# Patient Record
Sex: MALE | Race: ASIAN | NOT HISPANIC OR LATINO | URBAN - METROPOLITAN AREA
[De-identification: names, ages, dates, MRNs, and addresses within clinical notes are randomized per-mention and may not be internally consistent; named-entity substitution may affect disease eponyms.]

---

## 2024-05-25 ENCOUNTER — INPATIENT (INPATIENT)
Facility: HOSPITAL | Age: 49
LOS: 2 days | Discharge: HOME | DRG: 283 | End: 2024-05-28
Attending: SURGERY | Admitting: STUDENT IN AN ORGANIZED HEALTH CARE EDUCATION/TRAINING PROGRAM
Payer: MEDICAID

## 2024-05-25 VITALS
OXYGEN SATURATION: 100 % | SYSTOLIC BLOOD PRESSURE: 162 MMHG | WEIGHT: 177.91 LBS | DIASTOLIC BLOOD PRESSURE: 100 MMHG | TEMPERATURE: 98 F | RESPIRATION RATE: 18 BRPM | HEART RATE: 79 BPM

## 2024-05-25 DIAGNOSIS — R17 UNSPECIFIED JAUNDICE: ICD-10-CM

## 2024-05-25 LAB
ALBUMIN SERPL ELPH-MCNC: 4 G/DL — SIGNIFICANT CHANGE UP (ref 3.5–5.2)
ALBUMIN SERPL ELPH-MCNC: 4.4 G/DL — SIGNIFICANT CHANGE UP (ref 3.5–5.2)
ALP SERPL-CCNC: 270 U/L — HIGH (ref 30–115)
ALP SERPL-CCNC: 318 U/L — HIGH (ref 30–115)
ALT FLD-CCNC: 220 U/L — HIGH (ref 0–41)
ALT FLD-CCNC: 236 U/L — HIGH (ref 0–41)
ANION GAP SERPL CALC-SCNC: 12 MMOL/L — SIGNIFICANT CHANGE UP (ref 7–14)
ANION GAP SERPL CALC-SCNC: 13 MMOL/L — SIGNIFICANT CHANGE UP (ref 7–14)
APTT BLD: 34.5 SEC — SIGNIFICANT CHANGE UP (ref 27–39.2)
AST SERPL-CCNC: 106 U/L — HIGH (ref 0–41)
AST SERPL-CCNC: 96 U/L — HIGH (ref 0–41)
BASOPHILS # BLD AUTO: 0.06 K/UL — SIGNIFICANT CHANGE UP (ref 0–0.2)
BASOPHILS # BLD AUTO: 0.07 K/UL — SIGNIFICANT CHANGE UP (ref 0–0.2)
BASOPHILS NFR BLD AUTO: 0.9 % — SIGNIFICANT CHANGE UP (ref 0–1)
BASOPHILS NFR BLD AUTO: 0.9 % — SIGNIFICANT CHANGE UP (ref 0–1)
BILIRUB DIRECT SERPL-MCNC: 16 MG/DL — HIGH (ref 0–0.3)
BILIRUB DIRECT SERPL-MCNC: >17.2 MG/DL — HIGH (ref 0–0.3)
BILIRUB INDIRECT FLD-MCNC: 3.8 MG/DL — HIGH (ref 0.2–1.2)
BILIRUB INDIRECT FLD-MCNC: <4.9 MG/DL — HIGH (ref 0.2–1.2)
BILIRUB SERPL-MCNC: 19.8 MG/DL — CRITICAL HIGH (ref 0.2–1.2)
BILIRUB SERPL-MCNC: 22.1 MG/DL — CRITICAL HIGH (ref 0.2–1.2)
BUN SERPL-MCNC: 6 MG/DL — LOW (ref 10–20)
BUN SERPL-MCNC: 7 MG/DL — LOW (ref 10–20)
CALCIUM SERPL-MCNC: 10.1 MG/DL — SIGNIFICANT CHANGE UP (ref 8.4–10.4)
CALCIUM SERPL-MCNC: 9.5 MG/DL — SIGNIFICANT CHANGE UP (ref 8.4–10.5)
CHLORIDE SERPL-SCNC: 100 MMOL/L — SIGNIFICANT CHANGE UP (ref 98–110)
CHLORIDE SERPL-SCNC: 97 MMOL/L — LOW (ref 98–110)
CO2 SERPL-SCNC: 24 MMOL/L — SIGNIFICANT CHANGE UP (ref 17–32)
CO2 SERPL-SCNC: 27 MMOL/L — SIGNIFICANT CHANGE UP (ref 17–32)
CREAT SERPL-MCNC: 0.7 MG/DL — SIGNIFICANT CHANGE UP (ref 0.7–1.5)
CREAT SERPL-MCNC: 0.9 MG/DL — SIGNIFICANT CHANGE UP (ref 0.7–1.5)
EGFR: 103 ML/MIN/1.73M2 — SIGNIFICANT CHANGE UP
EGFR: 112 ML/MIN/1.73M2 — SIGNIFICANT CHANGE UP
EOSINOPHIL # BLD AUTO: 0.25 K/UL — SIGNIFICANT CHANGE UP (ref 0–0.7)
EOSINOPHIL # BLD AUTO: 0.35 K/UL — SIGNIFICANT CHANGE UP (ref 0–0.7)
EOSINOPHIL NFR BLD AUTO: 3.8 % — SIGNIFICANT CHANGE UP (ref 0–8)
EOSINOPHIL NFR BLD AUTO: 4.5 % — SIGNIFICANT CHANGE UP (ref 0–8)
GLUCOSE SERPL-MCNC: 151 MG/DL — HIGH (ref 70–99)
GLUCOSE SERPL-MCNC: 196 MG/DL — HIGH (ref 70–99)
HCT VFR BLD CALC: 39.5 % — LOW (ref 42–52)
HCT VFR BLD CALC: 43.1 % — SIGNIFICANT CHANGE UP (ref 42–52)
HGB BLD-MCNC: 13.9 G/DL — LOW (ref 14–18)
HGB BLD-MCNC: 14.7 G/DL — SIGNIFICANT CHANGE UP (ref 14–18)
IMM GRANULOCYTES NFR BLD AUTO: 0.5 % — HIGH (ref 0.1–0.3)
IMM GRANULOCYTES NFR BLD AUTO: 0.8 % — HIGH (ref 0.1–0.3)
INR BLD: 1.07 RATIO — SIGNIFICANT CHANGE UP (ref 0.65–1.3)
LDH SERPL L TO P-CCNC: 179 U/L — SIGNIFICANT CHANGE UP (ref 50–242)
LIDOCAIN IGE QN: 28 U/L — SIGNIFICANT CHANGE UP (ref 7–60)
LYMPHOCYTES # BLD AUTO: 0.96 K/UL — LOW (ref 1.2–3.4)
LYMPHOCYTES # BLD AUTO: 1.69 K/UL — SIGNIFICANT CHANGE UP (ref 1.2–3.4)
LYMPHOCYTES # BLD AUTO: 14.5 % — LOW (ref 20.5–51.1)
LYMPHOCYTES # BLD AUTO: 21.6 % — SIGNIFICANT CHANGE UP (ref 20.5–51.1)
MAGNESIUM SERPL-MCNC: 2.4 MG/DL — SIGNIFICANT CHANGE UP (ref 1.8–2.4)
MCHC RBC-ENTMCNC: 29.3 PG — SIGNIFICANT CHANGE UP (ref 27–31)
MCHC RBC-ENTMCNC: 29.4 PG — SIGNIFICANT CHANGE UP (ref 27–31)
MCHC RBC-ENTMCNC: 34.1 G/DL — SIGNIFICANT CHANGE UP (ref 32–37)
MCHC RBC-ENTMCNC: 35.2 G/DL — SIGNIFICANT CHANGE UP (ref 32–37)
MCV RBC AUTO: 83.5 FL — SIGNIFICANT CHANGE UP (ref 80–94)
MCV RBC AUTO: 85.9 FL — SIGNIFICANT CHANGE UP (ref 80–94)
MONOCYTES # BLD AUTO: 0.69 K/UL — HIGH (ref 0.1–0.6)
MONOCYTES # BLD AUTO: 0.82 K/UL — HIGH (ref 0.1–0.6)
MONOCYTES NFR BLD AUTO: 10.5 % — HIGH (ref 1.7–9.3)
MONOCYTES NFR BLD AUTO: 10.5 % — HIGH (ref 1.7–9.3)
NEUTROPHILS # BLD AUTO: 4.59 K/UL — SIGNIFICANT CHANGE UP (ref 1.4–6.5)
NEUTROPHILS # BLD AUTO: 4.84 K/UL — SIGNIFICANT CHANGE UP (ref 1.4–6.5)
NEUTROPHILS NFR BLD AUTO: 62 % — SIGNIFICANT CHANGE UP (ref 42.2–75.2)
NEUTROPHILS NFR BLD AUTO: 69.5 % — SIGNIFICANT CHANGE UP (ref 42.2–75.2)
NRBC # BLD: 0 /100 WBCS — SIGNIFICANT CHANGE UP (ref 0–0)
NRBC # BLD: 0 /100 WBCS — SIGNIFICANT CHANGE UP (ref 0–0)
PHOSPHATE SERPL-MCNC: 3.6 MG/DL — SIGNIFICANT CHANGE UP (ref 2.1–4.9)
PLATELET # BLD AUTO: 155 K/UL — SIGNIFICANT CHANGE UP (ref 130–400)
PLATELET # BLD AUTO: 168 K/UL — SIGNIFICANT CHANGE UP (ref 130–400)
PMV BLD: 12.9 FL — HIGH (ref 7.4–10.4)
PMV BLD: 12.9 FL — HIGH (ref 7.4–10.4)
POTASSIUM SERPL-MCNC: 4.1 MMOL/L — SIGNIFICANT CHANGE UP (ref 3.5–5)
POTASSIUM SERPL-MCNC: 4.3 MMOL/L — SIGNIFICANT CHANGE UP (ref 3.5–5)
POTASSIUM SERPL-SCNC: 4.1 MMOL/L — SIGNIFICANT CHANGE UP (ref 3.5–5)
POTASSIUM SERPL-SCNC: 4.3 MMOL/L — SIGNIFICANT CHANGE UP (ref 3.5–5)
PROT SERPL-MCNC: 6.4 G/DL — SIGNIFICANT CHANGE UP (ref 6–8)
PROT SERPL-MCNC: 7.3 G/DL — SIGNIFICANT CHANGE UP (ref 6–8)
PROTHROM AB SERPL-ACNC: 12.2 SEC — SIGNIFICANT CHANGE UP (ref 9.95–12.87)
RBC # BLD: 4.73 M/UL — SIGNIFICANT CHANGE UP (ref 4.7–6.1)
RBC # BLD: 5.02 M/UL — SIGNIFICANT CHANGE UP (ref 4.7–6.1)
RBC # FLD: 18.7 % — HIGH (ref 11.5–14.5)
RBC # FLD: 19.1 % — HIGH (ref 11.5–14.5)
SODIUM SERPL-SCNC: 136 MMOL/L — SIGNIFICANT CHANGE UP (ref 135–146)
SODIUM SERPL-SCNC: 137 MMOL/L — SIGNIFICANT CHANGE UP (ref 135–146)
WBC # BLD: 6.6 K/UL — SIGNIFICANT CHANGE UP (ref 4.8–10.8)
WBC # BLD: 7.81 K/UL — SIGNIFICANT CHANGE UP (ref 4.8–10.8)
WBC # FLD AUTO: 6.6 K/UL — SIGNIFICANT CHANGE UP (ref 4.8–10.8)
WBC # FLD AUTO: 7.81 K/UL — SIGNIFICANT CHANGE UP (ref 4.8–10.8)

## 2024-05-25 PROCEDURE — 76705 ECHO EXAM OF ABDOMEN: CPT | Mod: 26

## 2024-05-25 PROCEDURE — C1889: CPT

## 2024-05-25 PROCEDURE — 93005 ELECTROCARDIOGRAM TRACING: CPT

## 2024-05-25 PROCEDURE — 74177 CT ABD & PELVIS W/CONTRAST: CPT | Mod: 26,MC

## 2024-05-25 PROCEDURE — C1769: CPT

## 2024-05-25 PROCEDURE — 74018 RADEX ABDOMEN 1 VIEW: CPT

## 2024-05-25 PROCEDURE — 93010 ELECTROCARDIOGRAM REPORT: CPT

## 2024-05-25 PROCEDURE — 86901 BLOOD TYPING SEROLOGIC RH(D): CPT

## 2024-05-25 PROCEDURE — 99285 EMERGENCY DEPT VISIT HI MDM: CPT

## 2024-05-25 PROCEDURE — 84100 ASSAY OF PHOSPHORUS: CPT

## 2024-05-25 PROCEDURE — 80048 BASIC METABOLIC PNL TOTAL CA: CPT

## 2024-05-25 PROCEDURE — 94010 BREATHING CAPACITY TEST: CPT

## 2024-05-25 PROCEDURE — 76705 ECHO EXAM OF ABDOMEN: CPT

## 2024-05-25 PROCEDURE — C9399: CPT

## 2024-05-25 PROCEDURE — 36415 COLL VENOUS BLD VENIPUNCTURE: CPT

## 2024-05-25 PROCEDURE — 86900 BLOOD TYPING SEROLOGIC ABO: CPT

## 2024-05-25 PROCEDURE — 83735 ASSAY OF MAGNESIUM: CPT

## 2024-05-25 PROCEDURE — 99253 IP/OBS CNSLTJ NEW/EST LOW 45: CPT

## 2024-05-25 PROCEDURE — 83036 HEMOGLOBIN GLYCOSYLATED A1C: CPT

## 2024-05-25 PROCEDURE — 82962 GLUCOSE BLOOD TEST: CPT

## 2024-05-25 PROCEDURE — 85025 COMPLETE CBC W/AUTO DIFF WBC: CPT

## 2024-05-25 PROCEDURE — 85610 PROTHROMBIN TIME: CPT

## 2024-05-25 PROCEDURE — 86850 RBC ANTIBODY SCREEN: CPT

## 2024-05-25 PROCEDURE — 85730 THROMBOPLASTIN TIME PARTIAL: CPT

## 2024-05-25 PROCEDURE — C2625: CPT

## 2024-05-25 PROCEDURE — 80076 HEPATIC FUNCTION PANEL: CPT

## 2024-05-25 RX ORDER — PIPERACILLIN AND TAZOBACTAM 4; .5 G/20ML; G/20ML
3.38 INJECTION, POWDER, LYOPHILIZED, FOR SOLUTION INTRAVENOUS EVERY 8 HOURS
Refills: 0 | Status: DISCONTINUED | OUTPATIENT
Start: 2024-05-26 | End: 2024-05-28

## 2024-05-25 RX ORDER — PIPERACILLIN AND TAZOBACTAM 4; .5 G/20ML; G/20ML
3.38 INJECTION, POWDER, LYOPHILIZED, FOR SOLUTION INTRAVENOUS ONCE
Refills: 0 | Status: COMPLETED | OUTPATIENT
Start: 2024-05-25 | End: 2024-05-25

## 2024-05-25 RX ORDER — PIPERACILLIN AND TAZOBACTAM 4; .5 G/20ML; G/20ML
3.38 INJECTION, POWDER, LYOPHILIZED, FOR SOLUTION INTRAVENOUS ONCE
Refills: 0 | Status: COMPLETED | OUTPATIENT
Start: 2024-05-26 | End: 2024-05-26

## 2024-05-25 RX ORDER — ENOXAPARIN SODIUM 100 MG/ML
40 INJECTION SUBCUTANEOUS EVERY 24 HOURS
Refills: 0 | Status: DISCONTINUED | OUTPATIENT
Start: 2024-05-25 | End: 2024-05-28

## 2024-05-25 RX ORDER — SODIUM CHLORIDE 9 MG/ML
1000 INJECTION, SOLUTION INTRAVENOUS
Refills: 0 | Status: DISCONTINUED | OUTPATIENT
Start: 2024-05-25 | End: 2024-05-26

## 2024-05-25 RX ORDER — METOPROLOL TARTRATE 50 MG
25 TABLET ORAL
Refills: 0 | Status: DISCONTINUED | OUTPATIENT
Start: 2024-05-25 | End: 2024-05-25

## 2024-05-25 RX ORDER — METOPROLOL TARTRATE 50 MG
25 TABLET ORAL EVERY 12 HOURS
Refills: 0 | Status: DISCONTINUED | OUTPATIENT
Start: 2024-05-25 | End: 2024-05-28

## 2024-05-25 RX ORDER — METOPROLOL TARTRATE 50 MG
1 TABLET ORAL
Refills: 0 | DISCHARGE

## 2024-05-25 RX ORDER — AMLODIPINE BESYLATE 2.5 MG/1
5 TABLET ORAL DAILY
Refills: 0 | Status: DISCONTINUED | OUTPATIENT
Start: 2024-05-25 | End: 2024-05-25

## 2024-05-25 RX ORDER — LISINOPRIL 2.5 MG/1
20 TABLET ORAL DAILY
Refills: 0 | Status: DISCONTINUED | OUTPATIENT
Start: 2024-05-25 | End: 2024-05-25

## 2024-05-25 RX ORDER — ACETAMINOPHEN 500 MG
650 TABLET ORAL EVERY 6 HOURS
Refills: 0 | Status: DISCONTINUED | OUTPATIENT
Start: 2024-05-25 | End: 2024-05-28

## 2024-05-25 RX ORDER — AMLODIPINE BESYLATE AND BENAZEPRIL HYDROCHLORIDE 10; 20 MG/1; MG/1
1 CAPSULE ORAL
Refills: 0 | DISCHARGE

## 2024-05-25 RX ADMIN — PIPERACILLIN AND TAZOBACTAM 25 GRAM(S): 4; .5 INJECTION, POWDER, LYOPHILIZED, FOR SOLUTION INTRAVENOUS at 19:46

## 2024-05-25 RX ADMIN — SODIUM CHLORIDE 140 MILLILITER(S): 9 INJECTION, SOLUTION INTRAVENOUS at 19:26

## 2024-05-25 RX ADMIN — PIPERACILLIN AND TAZOBACTAM 200 GRAM(S): 4; .5 INJECTION, POWDER, LYOPHILIZED, FOR SOLUTION INTRAVENOUS at 15:31

## 2024-05-25 RX ADMIN — Medication 25 MILLIGRAM(S): at 18:11

## 2024-05-25 NOTE — PATIENT PROFILE ADULT - FALL HARM RISK - HARM RISK INTERVENTIONS
Communicate Risk of Fall with Harm to all staff/Reinforce activity limits and safety measures with patient and family/Tailored Fall Risk Interventions/Use of alarms - bed, chair and/or voice tab/Visual Cue: Yellow wristband and red socks/Bed in lowest position, wheels locked, appropriate side rails in place/Call bell, personal items and telephone in reach/Instruct patient to call for assistance before getting out of bed or chair/Non-slip footwear when patient is out of bed/[place] to call system/Physically safe environment - no spills, clutter or unnecessary equipment/Purposeful Proactive Rounding/Room/bathroom lighting operational, light cord in reach

## 2024-05-25 NOTE — CONSULT NOTE ADULT - ATTENDING COMMENTS
This note reflects my care of this patient on 5/25/2024.    This is 49 year old male with PMH of HTN, cholecystitis (6 years ago), presents with jaundice. Patient reports that he noticed scleral icterus 3 weeks ago. For the past 3 weeks, patient has been experiencing dark urine and light, myesha colored stools. Patient has mild RUQ abdominal pain and nausea, not associated with food for the past 2 weeks. Patient denies fevers, chills, SOB, CP, vomiting.     PE:  AAO x4  Chest: clear.  CV : rrr  Abdomen: mildly tender to palpation in epigastrium.    ASSESSMENT:  48 y/o male with Choledocholithiasis.  Dilated CBD.  Elevated LFTs.  Obstructive jaundice.  At risk for hemodynamic instability.    PLAN:  - pain control as needed  - encourage incentive spirometer  - use O2 with NC as needed  - keep MAP>65; on ivf  - NPO  - GI eval for ERCP  - follow LFTs  - follow serum electrolytes and UOP  - ID - put on empiric Zosyn  - DVT prophylaxis  Admit to SDU.
Obstructive jaundice due to a detected CBD stone seen on imaging. RUQ tenderness on exam. Denies pain endorses nausea. No evidence of suppurative cholangitis (no leukocytosis and normal VS). Continue to monitor LFTs CBC and VS. May consider ERCP on emergent basis. NPo after midnight for now. Patient requesting DC to get ERCP on outpatient basis with Dr. Montejo. Will continue to follow for now.

## 2024-05-25 NOTE — CONSULT NOTE ADULT - ASSESSMENT
49 year old male with no known PMH presented to ER with worsening jaundice x3 weeks with myesha colored stools, dark urine. Patient reports that he has never seen a doctor for this in the past 3 weeks. He reports intermittent epigastric and RUQ discomfort/pain 1/10 . He is tolerating diet, but sometimes has nausea, no vomiting, fevers, chills. He also reports he had cholecystitis 6-7 years was recommended CCY but patient never followed up. Currently hemodynamically stable with cholestatic liver pattern. GI consulted for choledocholithiasis.     #Cholecholithiasis  #Cholestatic liver injury likely CBD stone related  - T darya 22.1         05-25-24 @ 08:30, Direct bili >17.2  - exam benign  - no concern for cholangitis  - WBC: 6k, afebrile  - hx of cholecystitis 6-7 years ago  - denies alcohol use, unintentional weight loss, active smoker  - lipase normal  - CTAP IC: moderate to severe intrahepatic biliary ductal   dilatation secondary to choledocholithiasis. More specifically, 1 cm   calcified gallstone is impacted within the CBD which measures up to 2 cm.   The CBD in the region of the pancreatic head is normal in caliber.   Cholelithiasis is noted as well.    RECS  - Admit  - Recommend RUQ US  - Trend LFTs, monitor for fevers// monitor CBC  - If any decompensation will plan for urgent ERCP or else will plan for EUS+ERCP on 5/28  - Recommend surgery evaluation  - Can be clears for now from GI standpoint  - will follow    Plan discussed with attending     49 year old male with no known PMH presented to ER with worsening jaundice x3 weeks with myesha colored stools, dark urine. Patient reports that he has never seen a doctor for this in the past 3 weeks. He reports intermittent epigastric and RUQ discomfort/pain 1/10 . He is tolerating diet, but sometimes has nausea, no vomiting, fevers, chills. He also reports he had cholecystitis 6-7 years was recommended CCY but patient never followed up. Currently hemodynamically stable with cholestatic liver pattern. GI consulted for choledocholithiasis.     #Cholecholithiasis  #Cholestatic liver injury likely CBD stone related  - T darya 22.1         05-25-24 @ 08:30, Direct bili >17.2  - exam benign  - no concern for cholangitis  - WBC: 6k, afebrile  - hx of cholecystitis 6-7 years ago  - denies alcohol use, unintentional weight loss, active smoker  - lipase normal  - CTAP IC: moderate to severe intrahepatic biliary ductal   dilatation secondary to choledocholithiasis. More specifically, 1 cm   calcified gallstone is impacted within the CBD which measures up to 2 cm.   The CBD in the region of the pancreatic head is normal in caliber.   Cholelithiasis is noted as well.    RECS  - Admit  - Recommend RUQ US  - Trend LFTs, monitor for fevers// monitor CBC  - Will plan for EUS/ERCP this admission or sooner if any signs of decmpensation  - Recommend surgery evaluation for CCY  - Can be clears for now from GI standpoint  - will follow    Plan discussed with attending

## 2024-05-25 NOTE — ED PROVIDER NOTE - CLINICAL SUMMARY MEDICAL DECISION MAKING FREE TEXT BOX
49-year-old male past medical history of diabetes hypertension noncompliant with medications presents with 2 to 3 weeks of intermittent right upper quadrant pain associated with dark urine yellowing of skin yellow eyes skin itching.  No abdominal pain at this time.  No nausea vomiting diarrhea.  Denies alcohol use or Tylenol use.  No chest pain shortness of breath.  No urinary symptoms.  No recent travel sick contacts.  No fever or chills.    On exam, AFVSS, Well appearing, No acute distress, NCAT, EOMI, PERRLA, MMM, Neck supple, LCTAB, RRR nl s1s2 No mrg, Abdomen Soft NTND, no CVA tenderness, AAOx3, No Focal Deficits, No LE edema or calf TTP, jaundice and scleral icterus noted    A/P; jaundice, intermittent right upper quadrant pain, found to have T. bili 22 and choledocholithiasis on imaging, GI and surgery consulted, admit for further workup and treatment    Labs were ordered and reviewed.  Imaging was ordered and reviewed by me.  Appropriate medications for patient's presenting complaints were ordered and effects were reassessed.  Patient's records (prior hospital, ED visit, and/or nursing home notes if available) were reviewed.  Additional history was obtained from EMS, family, and/or PCP (where available).  Escalation to admission/observation was considered.  Patient requires inpatient hospitalization - monitored setting.

## 2024-05-25 NOTE — H&P ADULT - ATTENDING COMMENTS
ACS Attending Note Attestation    Patient is examined and evaluated at the bedside with the residents/PAs. Treatment plan discussed with the team, nurses, and consulting physicians and consulting teams. Medications, radiological studies and all other relevant studies reviewed. I reviewed the resident/PA note and agreed with above assessment and plan with following additions and corrections.    MEKHI RANGEL 49 year old male with PMH of HTN, cholecystitis (6 years ago), presents with jaundice. Patient reports that he noticed scleral icterus 3 weeks ago. For the past 3 weeks, patient has been experiencing dark urine and light, myesha colored stools. Patient has mild RUQ abdominal pain and nausea, no associated with food for the past 2 weeks. Patient denies fevers, chills, SOB, CP, vomiting. Patient is noncompliant to his blood pressure medications, reports stopped taking his blood pressure medications 3 days ago due to self decision making. At bedside examination, patient has scleral icterus and jaundice throughout, abdomen soft, ND, NT.  Physical Exam:  General: NAD, AAOx3, calm and cooperative  HEENT: NCAT, PARISA, EOMI, scleral icterus  Cardiac: RRR  Respiratory: CTAB, normal respiratory effort  Abdomen: Soft, non-distended, non-tender, no rebound, no guarding  Skin: Warm/dry, normal color, + jaundice  Radiological studies reviewed by me with following noted:  Allergies    No Known Allergies    Intolerances      PAST MEDICAL & SURGICAL HISTORY:  HTN (hypertension)            Vital Signs Last 24 Hrs  T(C): 37.1 (25 May 2024 19:32), Max: 37.1 (25 May 2024 19:32)  T(F): 98.8 (25 May 2024 19:32), Max: 98.8 (25 May 2024 19:32)  HR: 69 (25 May 2024 19:32) (69 - 79)  BP: 163/78 (25 May 2024 19:32) (149/87 - 163/78)  BP(mean): --  RR: 26 (25 May 2024 19:32) (18 - 26)  SpO2: 100% (25 May 2024 19:32) (99% - 100%)    Parameters below as of 25 May 2024 19:32  Patient On (Oxygen Delivery Method): room air                            13.9   7.81  )-----------( 155      ( 25 May 2024 16:42 )             39.5     05-25    136  |  100  |  6<L>  ----------------------------<  151<H>  4.1   |  24  |  0.7    Ca    9.5      25 May 2024 16:42  Phos  3.6     05-25  Mg     2.4     05-25    TPro  6.4  /  Alb  4.0  /  TBili  19.8<HH>  /  DBili  16.0<H>  /  AST  96<H>  /  ALT  220<H>  /  AlkPhos  270<H>  05-25      Diagnosis: Choledocholithiasis , stone in CBD with obstruction , billary ducts dilatation , juandice       - Admit to surgery   - SICU consult   - Close hemodynamic monitoring   - NPO, IVF   - IV ABX  - Trend LFTs  - GI following for ERCP	  - supportive care  - GI/DVT prophylaxis  - pain management  - follow up consults  - repeat studies as needed    Anjum Bowens MD, FACS  Trauma/ACS/Surcical Critical care Attending ACS Attending Note Attestation    Patient is examined and evaluated at the bedside with the residents/PAs. Treatment plan discussed with the team, nurses, and consulting physicians and consulting teams. Medications, radiological studies and all other relevant studies reviewed. I reviewed the resident/PA note and agreed with above assessment and plan with following additions and corrections.    MEKHI RANGEL 49 year old male with PMH of HTN, cholecystitis (6 years ago), presents with jaundice. Patient reports that he noticed scleral icterus 3 weeks ago. For the past 3 weeks, patient has been experiencing dark urine and light, myesha colored stools. Patient has mild RUQ abdominal pain and nausea, no associated with food for the past 2 weeks. Patient denies fevers, chills, SOB, CP, vomiting. Patient is noncompliant to his blood pressure medications, reports stopped taking his blood pressure medications 3 days ago due to self decision making. At bedside examination, patient has scleral icterus and jaundice throughout, abdomen soft, ND, NT.  Physical Exam:  General: NAD, AAOx3, calm and cooperative  HEENT: NCAT, PARISA, EOMI, scleral icterus  Cardiac: RRR  Respiratory: CTAB, normal respiratory effort  Abdomen: Soft, non-distended, non-tender, no rebound, no guarding  Skin: Warm/dry, normal color, + jaundice  Radiological studies reviewed by me with following noted:  Allergies    No Known Allergies    Intolerances      PAST MEDICAL & SURGICAL HISTORY:  HTN (hypertension)            Vital Signs Last 24 Hrs  T(C): 37.1 (25 May 2024 19:32), Max: 37.1 (25 May 2024 19:32)  T(F): 98.8 (25 May 2024 19:32), Max: 98.8 (25 May 2024 19:32)  HR: 69 (25 May 2024 19:32) (69 - 79)  BP: 163/78 (25 May 2024 19:32) (149/87 - 163/78)  BP(mean): --  RR: 26 (25 May 2024 19:32) (18 - 26)  SpO2: 100% (25 May 2024 19:32) (99% - 100%)    Parameters below as of 25 May 2024 19:32  Patient On (Oxygen Delivery Method): room air                            13.9   7.81  )-----------( 155      ( 25 May 2024 16:42 )             39.5     05-25    136  |  100  |  6<L>  ----------------------------<  151<H>  4.1   |  24  |  0.7    Ca    9.5      25 May 2024 16:42  Phos  3.6     05-25  Mg     2.4     05-25    TPro  6.4  /  Alb  4.0  /  TBili  19.8<HH>  /  DBili  16.0<H>  /  AST  96<H>  /  ALT  220<H>  /  AlkPhos  270<H>  05-25      Diagnosis: Choledocholithiasis , stone in CBD with obstruction , billary ducts dilatation , juandice, Cholelithiasis.     - Admit to surgery   - SICU consult   - Close hemodynamic monitoring   - NPO, IVF   - IV ABX  - Trend LFTs  - GI following for ERCP	  - supportive care  - GI/DVT prophylaxis  - pain management  - follow up consults  - repeat studies as needed    Anjum Bowens MD, FACS  Trauma/ACS/Surcical Critical care Attending

## 2024-05-25 NOTE — CONSULT NOTE ADULT - ASSESSMENT
Assessment & Plan    49y Male  POD# s/p       NEURO:  #Acute pain    -APAP prn, Gabapentin 100mg q8    -if intubated Fentanyl 12.5 mcg q4 prn    -Inpatient Rx: acetaminophen     Tablet .. 650 milliGRAM(s) Oral every 6 hours      RESP:   #Oxygenation    -wean off NC to RA as tolerated  #Activity    -increase as tolerated    CARDS:   #  Imaging:   Labs:   Rx-metoprolol tartrate 25 every 12 hours      GI/NUTR:   #Diet, NPO    Diet, NPO:   Except Medications     Special Instructions for Nursing:  Except Medications [Active]        -aspiration precautions, HOB 30  #GI Prophylaxis    -not indicated  #Bowel regimen    -senna/miralax    -last bowel movement      /RENAL:   #urine output in crtically ill    Labs:   BUN/Cr- 7/0.9  -->  Electrolytes-(05-25 @ 08:30)Na 137 // K 4.3 // Mg -- // Phos --     #maintain euvolemia                     HEME/ONC:     Labs: Hb/Hct:  14.7/43.1  (05-25 @ 08:30)  -->                      Plts:  168  -->                 PTT/INR:  34.5/1.07  --->       Blood Consent-obtain if acute anemia, q6 CBC    ID:  #monitor for leukocytosis/fever  WBC- 6.60  --->>  Temp trend- 24hrs T(F): 98.5 (05-25 @ 06:49), Max: 98.5 (05-25 @ 06:49)  Current antibiotics-      ENDO:    -FSG q6 if NPO or Tube feeds    -Glucose goal 140-180    -if above 180 start ISS     HA1C     MSK:     Activity - Ambulate as Tolerated:     Time/Priority:  Routine (05-25-24 @ 13:08)      HOME Medications:      LINES/DRAINS:  PIV, Flores    ADVANCED DIRECTIVES:  Full Code    HCP/Emergency Contact-    INDICATION FOR SICU/SDU: Jaundice             DISPO:   Case discussed with attending   Assessment & Plan  49y Male s/p choledocholithiasis admitted for hepatic function and hemodynamic monitoring    NEURO:  #Acute pain    -APAP q6 ATC    RESP:   #Oxygenation    -tolerated RA    -encourage IS  #Activity    -ambulateas tolerated    CARDS:   #Hx of HTN    - continue metoprolol tartrate 25 every 12 hours (home dose metoprolol succinate 50 QD)    - holding home amlodipine-benazepril     GI/NUTR:   #Choledocholithiasis    LIVER FUNCTIONS - ( 25 May 2024 08:30 )  Alb: 4.4 g/dL / Pro: 7.3 g/dL / ALK PHOS: 318 U/L / ALT: 236 U/L / AST: 106 U/L / GGT: x           - monitor for signs of cholangitis, trend LFTS    - GI following    - pending ERCP  #Diet, NPO Except Medications    -aspiration precautions, HOB 30  #GI Prophylaxis    -not indicated  #Bowel regimen    -not indicated    -last bowel movement 5/24 prior to admission    /RENAL:   #urine output in critically ill    - voiding freely  #maintain euvolemia    - on LR @ 140cc/hr  #2.3cm left renal mass incidental finding on CT    - possible outpatient urology followup may be needed    Labs:   BUN/Cr- 7/0.9  -->  Electrolytes-(05-25 @ 08:30)Na 137 // K 4.3 // Mg -- // Phos --          HEME/ONC:   #DVT prophylaxis    -enoxaparin Injectable  , SCDs    Labs: Hb/Hct:  14.7/43.1  -->                      Plts:  168  -->                 PTT/INR:  34.5/1.07  --->       Home Rx:   T&S Expires:     ID:  #monitor for leukocytosis/fever  WBC- 6.60  --->>  Temp trend- 24hrs T(F): 98.5 (05-25 @ 06:49), Max: 98.5 (05-25 @ 06:49)  No Current antibiotics    ENDO:  #Hx of DM    -FSG q6 while NPO    -Glucose goal 140-180    -if above 180 start ISS     HA1C     MSK:     Activity - Ambulate as Tolerated:     Time/Priority:  Routine (05-25-24 @ 13:08)    LINES/DRAINS:  PIV,     ADVANCED DIRECTIVES:  Full Code    HCP/Emergency Contact-    INDICATION FOR SDU: hemodynamic monitoring in setting of choledocholithiasis            DISPO:   SDU, Case discussed with Dr. Zabala

## 2024-05-25 NOTE — ED PROVIDER NOTE - CARE PLAN
Principal Discharge DX:	Jaundice  Secondary Diagnosis:	Choledocholithiasis with obstruction  Secondary Diagnosis:	Transaminitis   1

## 2024-05-25 NOTE — ED PROVIDER NOTE - PROGRESS NOTE DETAILS
GI at bedside GI consulted for choledocholithiasis and biliary ductal dilatation Gi recs--clears for now, RUQ US and surgery consult.

## 2024-05-25 NOTE — H&P ADULT - NSVTERISKASSESS_GEN_ALL_CORE FT
Medical Assessment Completed on: 25-May-2024 12:28 Surgical Assessment Completed on: 25-May-2024 13:18

## 2024-05-25 NOTE — ED PROVIDER NOTE - ATTENDING APP SHARED VISIT CONTRIBUTION OF CARE
49-year-old male past medical history of diabetes hypertension noncompliant with medications presents with 2 to 3 weeks of intermittent right upper quadrant pain associated with dark urine yellowing of skin yellow eyes skin itching.  No abdominal pain at this time.  No nausea vomiting diarrhea.  Denies alcohol use or Tylenol use.  No chest pain shortness of breath.  No urinary symptoms.  No recent travel sick contacts.  No fever or chills.    On exam, AFVSS, Well appearing, No acute distress, NCAT, EOMI, PERRLA, MMM, Neck supple, LCTAB, RRR nl s1s2 No mrg, Abdomen Soft NTND, no CVA tenderness, AAOx3, No Focal Deficits, No LE edema or calf TTP, jaundice and scleral icterus noted    A/P; jaundice, intermittent right upper quadrant pain, found to have T. bili 22 and choledocholithiasis on imaging, GI and surgery consulted, admit for further workup and treatment

## 2024-05-25 NOTE — CONSULT NOTE ADULT - SUBJECTIVE AND OBJECTIVE BOX
Gastroenterology Consultation:    Patient is a 49y old  Male who presents with a chief complaint of       Admitted on: 05-25-24      HPI:  49 year old male with no known PMH presented to ER with worsening jaundice x3 weeks with myesha colored stools, dark urine. Patient reports that he has never seen a doctor for this in the past 3 weeks. He reports intermittent epigastric and RUQ discomfort/pain 1/10 . He is tolerating diet, but sometimes has nausea, no vomiting, fevers, chills. He also reports he had cholecystitis 6-7 years was recommended CCY but patient never followed up. Currently hemodynamically stable with cholestatic liver pattern. GI consulted for choledocholithiasis.       Prior EGD: never had any    Prior Colonoscopy: never had any      PAST MEDICAL & SURGICAL HISTORY:        FAMILY HISTORY:  NO GI related malignancies/disorders/IBD    Social History:  Tobacco: active smoker  Alcohol: denies  Drugs: denies    Home Medications:        MEDICATIONS  (STANDING):    MEDICATIONS  (PRN):      Allergies  No Known Allergies      Review of Systems:   Constitutional:  No Fever, No Chills  ENT/Mouth:  No Hearing Changes,  No Difficulty Swallowing  Eyes:  No Eye Pain, No Vision Changes  Cardiovascular:  No Chest Pain, No Palpitations  Respiratory:  No Cough, No Dyspnea  Gastrointestinal:  As described in HPI          Physical Examination:  T(C): 36.9 (05-25-24 @ 06:49), Max: 36.9 (05-25-24 @ 06:49)  HR: 79 (05-25-24 @ 06:49) (79 - 79)  BP: 162/100 (05-25-24 @ 06:49) (162/100 - 162/100)  RR: 18 (05-25-24 @ 06:49) (18 - 18)  SpO2: 100% (05-25-24 @ 06:49) (100% - 100%)    Weight (kg): 80.7 (05-25-24 @ 06:49)        GENERAL: AAOx3, no acute distress.  HEAD:  Atraumatic, Normocephalic  EYES: conjunctiva and sclera clear  CHEST/LUNG: Clear to auscultation bilaterally; No wheeze, rhonchi, or rales  HEART: Regular rate and rhythm; normal S1, S2, No murmurs.  ABDOMEN: Soft, nontender, nondistended; Bowel sounds present  SKIN: Intact, jaundice +        Data:                        14.7   6.60  )-----------( 168      ( 25 May 2024 08:30 )             43.1     Hgb Trend:  14.7  05-25-24 @ 08:30      05-25    137  |  97<L>  |  7<L>  ----------------------------<  196<H>  4.3   |  27  |  0.9    Ca    10.1      25 May 2024 08:30    TPro  7.3  /  Alb  4.4  /  TBili  22.1<HH>  /  DBili  >17.2<H>  /  AST  106<H>  /  ALT  236<H>  /  AlkPhos  318<H>  05-25    Liver panel trend:  TBili 22.1   /      /      /   AlkP 318   /   Tptn 7.3   /   Alb 4.4    /   DBili >17.2      05-25      PT/INR - ( 25 May 2024 08:30 )   PT: 12.20 sec;   INR: 1.07 ratio         PTT - ( 25 May 2024 08:30 )  PTT:34.5 sec        Radiology:  CT Abdomen and Pelvis w/ IV Cont:   ACC: [id number] EXAM:  CT ABDOMEN AND PELVIS IC   ORDERED BY: NATALIE HANDY     PROCEDURE DATE:  05/25/2024          INTERPRETATION:  Clinical History / Reason for exam: Jaundice    Technique:  Contiguous axial CT images of the abdomen and pelvis were   obtained following administration of intravenous contrast.  Oral contrast   was not administered.  Reformatted images in the coronal and sagittal   planes were acquired.    IV Contrast: Omnipaque 350  100 cc administered   0 cc discarded  Oral Contrast: NONE  Complications: None reported at time of study completion        Comparison CT: None    FINDINGS:    LOWER CHEST: Lateral groundglass opacities likely secondary to   atelectatic changes as well as small airway disease.    HEPATOBILIARY: There is moderate to severe intrahepatic biliary ductal   dilatation secondary to choledocholithiasis. More specifically, 1 cm   calcified gallstone is impacted within the CBD which measures up to 2 cm.   The CBD in the region of the pancreatic head is normal in caliber.   Cholelithiasis is noted as well.    SPLEEN: Unremarkable.    PANCREAS: Unremarkable.    ADRENAL GLANDS: Nonspecific thickening of the left adrenal. Unremarkable   right adrenal.    KIDNEYS: Symmetric pattern of renal enhancement. Noevidence of   hydronephrosis or hydroureter. Above fluid density 2.3 cm lesion is   arising from the left upper renal pole.    ABDOMINOPELVIC NODES: No enlarged abdominal or pelvic lymph nodes.    PELVIC ORGANS: Unremarkable.    PERITONEUM/MESENTERY/BOWEL: No bowel obstruction, ascites or free   intraperitoneal air. The appendix is normal in caliber.    BONES/SOFT TISSUES: There are degenerative changes of the spine and both   hips.    VASCULAR:  The aorta is normal in caliber. There are mild atherosclerotic   calcifications.      IMPRESSION:      Choledocholithiasis with upstream intrahepatic biliary ductal dilatation   as above.    Cholelithiasis.    --- End of Report ---            BRANDY GERARDO MD; Attending Radiologist  This document has been electronically signed. May 25 2024  8:57AM (05-25-24 @ 08:42)

## 2024-05-25 NOTE — CHART NOTE - NSCHARTNOTEFT_GEN_A_CORE
I was initially admitting the patient however the Patient will be now be admitted to surgery.  Upon Chart reviewing the patient that patient appeared to have a 2.3 cm  Left renal mass.   Please refer to Outpatient urology for assessment of this mass. \  Thank you

## 2024-05-25 NOTE — ED PROVIDER NOTE - OBJECTIVE STATEMENT
48yo male with PMHx DM non-compliant with metformin x 2 yrs and HTN, also intermittently compliant with anti-hypertensive meds, presents c/o jaundice for the past 2-3 days and dark colored urine x 2-3 weeks. He reports some nausea. He denies fever, chills, vomiting, diarrhea. Denies any specific aggravating or relieving factors. Pt reports was told years prior that he had gallstones and was a surgical candidate but once pain dissipated he did not follow up for surgery.

## 2024-05-25 NOTE — CONSULT NOTE ADULT - SUBJECTIVE AND OBJECTIVE BOX
MEKHI RANGEL   [id number]   [birth year]  49yM    Admit Date: 05-25-24  Indication for SDU: Hemodynamic and hepatic function monitoring in setting of choledocholithiasis        ============================  HPI   49 year old male with PMH of HTN, cholecystitis (6 years ago), presents with jaundice. Patient reports that he noticed scleral icterus 3 weeks ago. For the past 3 weeks, patient has been experiencing dark urine and light, myesha colored stools. Patient has mild RUQ abdominal pain and nausea, not associated with food for the past 2 weeks. Patient denies fevers, chills, SOB, CP, vomiting. Patient is noncompliant to his blood pressure medications, reports stopped taking his blood pressure medications 3 days ago due to self decision making. Patient also used to take metformin years ago that he stopped himself. At bedside examination, patient has scleral icterus and jaundice throughout, abdomen soft, ND, NT.           < from: CT Abdomen and Pelvis w/ IV Cont (05.25.24 @ 08:42) >  HEPATOBILIARY: There is moderate to severe intrahepatic biliary ductal   dilatation secondary to choledocholithiasis. More specifically, 1 cm   calcified gallstone is impacted within the CBD which measures up to 2 cm.   The CBD in the region of the pancreatic head is normal in caliber.   Cholelithiasis is noted as well.      SICU Consult for Hemodynamic and hepatic function monitoring in setting of choledocholithiasis Patient evaluated in ED with VS /76, HR 75, O2 99% on RA, and temp of 98.6F. Patient appears comfortable, A&O x3. Skin appears significantly jaundiced and B/L scleral icterus noted. Abdomen noted to be soft, nondistended, and minimal RUQ tenderness. Patient is pending RUQ ultrasound. Patient currently not complaining of nausea or vomiting, last BM was 5/24     24 Hour Events  -Admission under SICU service    [X] A ten-point review of systems was otherwise negative except as noted above.  [  ] Due to altered mental status/intubation, subjective information was not attained from the patient. History was obtained, to the extent possible, from review of the chart and collateral sources of information.    =========================================================================================================================================  =========================================================================================================================================    PMH  PAST MEDICAL & SURGICAL HISTORY:  HTN (hypertension)  Diabetes    No significant past surgical history      Home Meds:  Home Medications:  amlodipine-benazepril 5 mg-20 mg oral capsule: 1 cap(s) orally once a day (25 May 2024 13:06)  metoprolol succinate 50 mg oral tablet, extended release: 1 tab(s) orally once a day (25 May 2024 13:07)     Allergies  No Known Allergies     Current Medications:  acetaminophen     Tablet .. 650 milliGRAM(s) Oral every 6 hours  amLODIPine   Tablet 5 milliGRAM(s) Oral daily  enoxaparin Injectable 40 milliGRAM(s) SubCutaneous every 24 hours  lactated ringers. 1000 milliLiter(s) (140 mL/Hr) IV Continuous <Continuous>  lisinopril 20 milliGRAM(s) Oral daily  metoprolol tartrate 25 milliGRAM(s) Oral two times a day      Vital Sings, Intake/Output (Last 24Hours)  ICU Vital Signs Last 24 Hrs  T(C): 36.9 (25 May 2024 06:49), Max: 36.9 (25 May 2024 06:49)  T(F): 98.5 (25 May 2024 06:49), Max: 98.5 (25 May 2024 06:49)  HR: 79 (25 May 2024 06:49) (79 - 79)  BP: 162/100 (25 May 2024 06:49) (162/100 - 162/100)  BP(mean): --  ABP: --  ABP(mean): --  RR: 18 (25 May 2024 06:49) (18 - 18)  SpO2: 100% (25 May 2024 06:49) (100% - 100%)      Physical Exam:  ----------------------------------------------------------------------------------------------------------  Exam: A&Ox3, no focal deficits  B/L scleral icterus noted.    RESPIRATORY:  Normal expansion/effort    CARDIOVASCULAR:   S1/S2.  RRR  No peripheral edema    GASTROINTESTINAL:  Abdomen noted to be soft, nondistended, and minimal RUQ tenderness.  Bowel sounds present    MUSCULOSKELETAL:  Extremities warm, pink, well-perfused.    DERM:  Skin appears significantly jaundiced all throughout.     :   Exam: no juarez    Tubes/Lines/Drains   ----------------------------------------------------------------------------------------------------------  [x] Peripheral IV		       MEKHI RANGEL   [id number]   [birth year]  49yM    Admit Date: 05-25-24  Indication for SDU: Hemodynamic and hepatic function monitoring in setting of choledocholithiasis        ============================  HPI   49 year old male with PMH of HTN, cholecystitis (6 years ago), presents with jaundice. Patient reports that he noticed scleral icterus 3 weeks ago. For the past 3 weeks, patient has been experiencing dark urine and light, myesha colored stools. Patient has mild RUQ abdominal pain and nausea, not associated with food for the past 2 weeks. Patient denies fevers, chills, SOB, CP, vomiting. Patient is noncompliant to his blood pressure medications, reports stopped taking his blood pressure medications 3 days ago due to self decision making. Patient also used to take metformin years ago that he stopped himself. At bedside examination, patient has scleral icterus and jaundice throughout, abdomen soft, ND, NT.           < from: CT Abdomen and Pelvis w/ IV Cont (05.25.24 @ 08:42) >  HEPATOBILIARY: There is moderate to severe intrahepatic biliary ductal   dilatation secondary to choledocholithiasis. More specifically, 1 cm   calcified gallstone is impacted within the CBD which measures up to 2 cm.   The CBD in the region of the pancreatic head is normal in caliber.   Cholelithiasis is noted as well.      SICU Consult for Hemodynamic and hepatic function monitoring in setting of choledocholithiasis Patient evaluated in ED with VS /76, HR 75, O2 99% on RA, and temp of 98.6F. Patient appears comfortable, A&O x3. Skin appears significantly jaundiced and B/L scleral icterus noted. Abdomen noted to be soft, nondistended, and minimal RUQ tenderness. Patient is pending RUQ ultrasound. Patient currently not complaining of nausea or vomiting, last BM was 5/24. T bili is 22.1     24 Hour Events  -Admission under SICU service    [X] A ten-point review of systems was otherwise negative except as noted above.  [  ] Due to altered mental status/intubation, subjective information was not attained from the patient. History was obtained, to the extent possible, from review of the chart and collateral sources of information.    =========================================================================================================================================  =========================================================================================================================================    PMH  PAST MEDICAL & SURGICAL HISTORY:  HTN (hypertension)  Diabetes    No significant past surgical history      Home Meds:  Home Medications:  amlodipine-benazepril 5 mg-20 mg oral capsule: 1 cap(s) orally once a day (25 May 2024 13:06)  metoprolol succinate 50 mg oral tablet, extended release: 1 tab(s) orally once a day (25 May 2024 13:07)     Allergies  No Known Allergies     Current Medications:  acetaminophen     Tablet .. 650 milliGRAM(s) Oral every 6 hours  amLODIPine   Tablet 5 milliGRAM(s) Oral daily  enoxaparin Injectable 40 milliGRAM(s) SubCutaneous every 24 hours  lactated ringers. 1000 milliLiter(s) (140 mL/Hr) IV Continuous <Continuous>  lisinopril 20 milliGRAM(s) Oral daily  metoprolol tartrate 25 milliGRAM(s) Oral two times a day      Vital Sings, Intake/Output (Last 24Hours)  ICU Vital Signs Last 24 Hrs  T(C): 36.9 (25 May 2024 06:49), Max: 36.9 (25 May 2024 06:49)  T(F): 98.5 (25 May 2024 06:49), Max: 98.5 (25 May 2024 06:49)  HR: 79 (25 May 2024 06:49) (79 - 79)  BP: 162/100 (25 May 2024 06:49) (162/100 - 162/100)  BP(mean): --  ABP: --  ABP(mean): --  RR: 18 (25 May 2024 06:49) (18 - 18)  SpO2: 100% (25 May 2024 06:49) (100% - 100%)      Physical Exam:  ----------------------------------------------------------------------------------------------------------  Exam: A&Ox3, no focal deficits  B/L scleral icterus noted.    RESPIRATORY:  Normal expansion/effort    CARDIOVASCULAR:   S1/S2.  RRR  No peripheral edema    GASTROINTESTINAL:  Abdomen noted to be soft, nondistended, and minimal RUQ tenderness.  Bowel sounds present    MUSCULOSKELETAL:  Extremities warm, pink, well-perfused.    DERM:  Skin appears significantly jaundiced all throughout.     :   Exam: no juarez    Tubes/Lines/Drains   ----------------------------------------------------------------------------------------------------------  [x] Peripheral IV

## 2024-05-25 NOTE — ED ADULT NURSE NOTE - CHIEF COMPLAINT
HR=56 bpm, XAGZ=787/74 mmhg, SpO2=95.0 %, Resp=12 B/min, EtCO2=38 mmHg, Apnea=10 Seconds, Pain=0, Ron=10, Louis=2 The patient is a 49y Male complaining of jaundice.

## 2024-05-25 NOTE — H&P ADULT - NSHPPHYSICALEXAM_GEN_ALL_CORE
VITALS:  T(F): 98.5 (05-25-24 @ 06:49), Max: 98.5 (05-25-24 @ 06:49)  HR: 79 (05-25-24 @ 06:49) (79 - 79)  BP: 162/100 (05-25-24 @ 06:49) (162/100 - 162/100)  RR: 18 (05-25-24 @ 06:49) (18 - 18)  SpO2: 100% (05-25-24 @ 06:49) (100% - 100%)    PHYSICAL EXAM:  General: NAD, AAOx3, calm and cooperative  HEENT: NCAT, PARISA, EOMI, scleral icterus  Cardiac: RRR  Respiratory: CTAB, normal respiratory effort  Abdomen: Soft, non-distended, non-tender, no rebound, no guarding  Skin: Warm/dry, normal color, + jaundice
General: AOx3, NAD, jaundiced   Chest: GBAE, no crackles, or wheezes   HEart: RRR, no murmur   abdomen: soft, non-tender, non-distended   Extremities: + PP, no edema  Skin: Jaundiced

## 2024-05-25 NOTE — H&P ADULT - HISTORY OF PRESENT ILLNESS
Patient: MEKHI RANGEL , 49y ([birth year])Male   MRN: [id number]  Location: [facility] ED Hold 006 A  Visit: 05-25-24 Inpatient  Date: 05-25-24 @ 12:54    HPI: 49 year old male with PMH of HTN, cholecystitis (6 years ago), presents with jaundice. Patient reports that he noticed scleral icterus 3 weeks ago. For the past 3 weeks, patient has been experiencing dark urine and light, myesha colored stools. Patient has mild RUQ abdominal pain and nausea, no associated with food for the past 2 weeks. Patient denies fevers, chills, SOB, CP, vomiting. Patient is noncompliant to his blood pressure medications, reports stopped taking his blood pressure medications 3 days ago due to self decision making. At bedside examination, patient has scleral icterus and jaundice throughout, abdomen soft, ND, NT.   
49 year old male with a PMH of HTN and DM presented to ED with worsening jaundice of 3 weeks duration associated with myesha colored stools, dark urine. Patient reported that he had a history of cholecystitis 6 years ago, and never followed up for that.   History goes back to ... He reports intermittent epigastric and RUQ discomfort/pain 1/10 . He is tolerating diet,    GI evaluated the Patient recommended RUQ US,  EUS+ERCP on 5/28, if any decompensation urgent ERCP, Surgery Evaluation and Clear liquid diet    Patient is admitted for choledocholithiasis     Labs noticeable for glucose 196, T bili 22, D. Bili 17, , AST//236     CTAP showed:   * There is moderate to severe intrahepatic biliary ductal dilatation secondary to choledocholithiasis.   * More specifically, 1 cm calcified gallstone is impacted within the CBD which measures up to 2 cm.   * The CBD in the region of the pancreatic head is normal in caliber.   * Cholelithiasis is noted as well.   * 2.3 cm lesion is arising from the left upper renal pole.   * Lateral groundglass opacities likely secondary to atelectatic changes as well as small airway disease.    VS significant for /100    Vital Signs Last 24 Hrs  T(C): 36.9 (25 May 2024 06:49), Max: 36.9 (25 May 2024 06:49)  T(F): 98.5 (25 May 2024 06:49), Max: 98.5 (25 May 2024 06:49)  HR: 79 (25 May 2024 06:49) (79 - 79)  BP: 162/100 (25 May 2024 06:49) (162/100 - 162/100)  RR: 18 (25 May 2024 06:49) (18 - 18)  SpO2: 100% (25 May 2024 06:49) (100% - 100%)

## 2024-05-25 NOTE — H&P ADULT - ASSESSMENT
# Choledocholithiasis:  # CBD dilatation   - T bili 22, D. Bili 17, , AST//236   - no signs of cholangitis   - no WBC, Fever, Confusion, hypotension, etc  - CTAP showed:   * There is moderate to severe intrahepatic biliary ductal dilatation secondary to choledocholithiasis.   * More specifically, 1 cm calcified gallstone is impacted within the CBD which measures up to 2 cm.   * The CBD in the region of the pancreatic head is normal in caliber.   * Cholelithiasis is noted as well.   - GI recs appreciated   - IV fluids @ 75 cc/ hr   - Check Lactate   - trend CBC CMP Check TSH A1c Lipid panel  - FU INR PTT  - Active type and screen  -  NPO on 05/27/2024 midnight for EUS+ERCP on 05/28/24, and hold Lovenox as well   - RUQ US   - Urgent ERCP if becomes HD unstable or develops signs of cholangitis   - Trend Bilirubin T/D   - FU Lactate  - Surgery Evaluation   - Clear Liquid Diet   - Protonix 40 mg PO OD  - FU GI Recs     # DM:  - glucose 196  - not on any current medications   - FU A1c  - ISS and adjust accordingly     # small Airway disease:  - no wheezing or Crackles  - Will check RVP and FU As OP     # Left Renal Mass; R/O RCC   - FU UA   - 2.3 cm lesion is arising from the left upper renal pole.  - Consult Urology     # HTN:  - /100  - not on any medication   - Will start Amlodipine 10 for now  - Trend BP     Diet: clear Liquid   Activity: AAT   DVT Prophylaxis: Lovenox   GI Prophylaxis: Protonix   CHG Order  Code Status: full code   Disposition: Medicine/ acute.

## 2024-05-25 NOTE — CONSULT NOTE ADULT - CONSULT REASON
Hemodynamic monitoring in setting of choledocolithiasis Hemodynamic and hepatic function monitoring in setting of choledocholithiasis

## 2024-05-25 NOTE — ED PROVIDER NOTE - PHYSICAL EXAMINATION
CONST: Well appearing in NAD  EYES: PERRL, EOMI, (+) scleral icterus  ENT: No nasal discharge. TM's clear B/L without drainage. Oropharynx normal appearing, no erythema or exudates. Uvula midline.  CARD: Normal S1 S2; Normal rate and rhythm  RESP: Equal BS B/L, No wheezes, rhonchi or rales. No distress  GI: Soft, non-tender, non-distended.  MS: Normal ROM in all extremities. No midline spinal tenderness.  SKIN: (+) jaundice  NEURO: A&Ox3, No focal deficits. Strength 5/5 with no sensory deficits.

## 2024-05-25 NOTE — H&P ADULT - VTE RISK ASSESSMENT
VTE Assessment already completed for this visit <<--- Click to launch Bilateral Helical Rim Advancement Flap Text: The defect edges were debeveled with a #15 blade scalpel.  Given the location of the defect and the proximity to free margins (helical rim) a bilateral helical rim advancement flap was deemed most appropriate.  Using a sterile surgical marker, the appropriate advancement flaps were drawn incorporating the defect and placing the expected incisions between the helical rim and antihelix where possible.  The area thus outlined was incised through and through with a #15 scalpel blade.  With a skin hook and iris scissors, the flaps were gently and sharply undermined and freed up.

## 2024-05-25 NOTE — H&P ADULT - NSHPLABSRESULTS_GEN_ALL_CORE
LAB/STUDIES:                        14.7   6.60  )-----------( 168      ( 25 May 2024 08:30 )             43.1     05-25    137  |  97<L>  |  7<L>  ----------------------------<  196<H>  4.3   |  27  |  0.9    Ca    10.1      25 May 2024 08:30    TPro  7.3  /  Alb  4.4  /  TBili  22.1<HH>  /  DBili  >17.2<H>  /  AST  106<H>  /  ALT  236<H>  /  AlkPhos  318<H>  05-25    PT/INR - ( 25 May 2024 08:30 )   PT: 12.20 sec;   INR: 1.07 ratio         PTT - ( 25 May 2024 08:30 )  PTT:34.5 sec  LIVER FUNCTIONS - ( 25 May 2024 08:30 )  Alb: 4.4 g/dL / Pro: 7.3 g/dL / ALK PHOS: 318 U/L / ALT: 236 U/L / AST: 106 U/L / GGT: x           Urinalysis Basic - ( 25 May 2024 08:30 )    Color: x / Appearance: x / SG: x / pH: x  Gluc: 196 mg/dL / Ketone: x  / Bili: x / Urobili: x   Blood: x / Protein: x / Nitrite: x   Leuk Esterase: x / RBC: x / WBC x   Sq Epi: x / Non Sq Epi: x / Bacteria: x                  IMAGING:  < from: CT Abdomen and Pelvis w/ IV Cont (05.25.24 @ 08:42) >    Choledocholithiasis with upstream intrahepatic biliary ductal dilatation   as above.
LABS:                        14.7   6.60  )-----------( 168      ( 25 May 2024 08:30 )             43.1     05-25    137  |  97<L>  |  7<L>  ----------------------------<  196<H>  4.3   |  27  |  0.9    Ca    10.1      25 May 2024 08:30    TPro  7.3  /  Alb  4.4  /  TBili  22.1<HH>  /  DBili  >17.2<H>  /  AST  106<H>  /  ALT  236<H>  /  AlkPhos  318<H>  05-25    PT/INR - ( 25 May 2024 08:30 )   PT: 12.20 sec;   INR: 1.07 ratio         PTT - ( 25 May 2024 08:30 )  PTT:34.5 sec

## 2024-05-25 NOTE — H&P ADULT - ASSESSMENT
ASSESSMENT:  49yM w/ PMHx of  HTN, cholecystitis (6 years ago)  who presented with jaundice. Physical exam findings, imaging, and labs as documented above.     PLAN:  -   -  -    Lines/Tubes: PIV, Midline, Central Line, A-Line, Chest tubes, Kumar/HITESH drains, Flores Catheter.    Above plan discussed with Attending Surgeon Dr. Valero , patient, patient family, and Primary team  05-25-24 @ 12:54   ASSESSMENT:  49yM w/ PMHx of  HTN, cholecystitis (6 years ago)  who presented with jaundice. Physical exam findings, imaging, and labs as documented above.     PLAN:  - Admit to surgery   - SICU consult   - Hemodynamic monitoring   - NPO, IVF   - F/u GI recommendations   - Trend LFTs      Above plan discussed with Attending Surgeon Dr. Valero , patient, patient family, and Primary team  05-25-24 @ 12:54   ASSESSMENT:  49yM w/ PMHx of  HTN, cholecystitis (6 years ago)  who presented with chief complaint of jaundice. Physical exam findings, imaging, and labs as documented above.     PLAN:  - Admit to surgery   - SICU consult   - Close hemodynamic monitoring   - NPO, IVF   - IV ABX  - Trend LFTs  - GI following for ERCP      Above plan discussed with Attending Surgeon Dr. Valero , patient, patient family, and Primary team  05-25-24 @ 12:54

## 2024-05-26 LAB
ALBUMIN SERPL ELPH-MCNC: 3.7 G/DL — SIGNIFICANT CHANGE UP (ref 3.5–5.2)
ALBUMIN SERPL ELPH-MCNC: 3.9 G/DL — SIGNIFICANT CHANGE UP (ref 3.5–5.2)
ALP SERPL-CCNC: 268 U/L — HIGH (ref 30–115)
ALP SERPL-CCNC: 284 U/L — HIGH (ref 30–115)
ALT FLD-CCNC: 178 U/L — HIGH (ref 0–41)
ALT FLD-CCNC: 188 U/L — HIGH (ref 0–41)
ANION GAP SERPL CALC-SCNC: 11 MMOL/L — SIGNIFICANT CHANGE UP (ref 7–14)
ANION GAP SERPL CALC-SCNC: 12 MMOL/L — SIGNIFICANT CHANGE UP (ref 7–14)
APTT BLD: 36.6 SEC — SIGNIFICANT CHANGE UP (ref 27–39.2)
AST SERPL-CCNC: 76 U/L — HIGH (ref 0–41)
AST SERPL-CCNC: 84 U/L — HIGH (ref 0–41)
BASOPHILS # BLD AUTO: 0.06 K/UL — SIGNIFICANT CHANGE UP (ref 0–0.2)
BASOPHILS # BLD AUTO: 0.07 K/UL — SIGNIFICANT CHANGE UP (ref 0–0.2)
BASOPHILS NFR BLD AUTO: 0.9 % — SIGNIFICANT CHANGE UP (ref 0–1)
BASOPHILS NFR BLD AUTO: 1 % — SIGNIFICANT CHANGE UP (ref 0–1)
BILIRUB DIRECT SERPL-MCNC: 15.3 MG/DL — HIGH (ref 0–0.3)
BILIRUB DIRECT SERPL-MCNC: 16.4 MG/DL — HIGH (ref 0–0.3)
BILIRUB INDIRECT FLD-MCNC: 3.4 MG/DL — HIGH (ref 0.2–1.2)
BILIRUB INDIRECT FLD-MCNC: 5.7 MG/DL — HIGH (ref 0.2–1.2)
BILIRUB SERPL-MCNC: 19.8 MG/DL — CRITICAL HIGH (ref 0.2–1.2)
BILIRUB SERPL-MCNC: 21 MG/DL — CRITICAL HIGH (ref 0.2–1.2)
BUN SERPL-MCNC: 11 MG/DL — SIGNIFICANT CHANGE UP (ref 10–20)
BUN SERPL-MCNC: 8 MG/DL — LOW (ref 10–20)
CALCIUM SERPL-MCNC: 9.3 MG/DL — SIGNIFICANT CHANGE UP (ref 8.4–10.4)
CALCIUM SERPL-MCNC: 9.9 MG/DL — SIGNIFICANT CHANGE UP (ref 8.4–10.5)
CHLORIDE SERPL-SCNC: 97 MMOL/L — LOW (ref 98–110)
CHLORIDE SERPL-SCNC: 98 MMOL/L — SIGNIFICANT CHANGE UP (ref 98–110)
CO2 SERPL-SCNC: 27 MMOL/L — SIGNIFICANT CHANGE UP (ref 17–32)
CO2 SERPL-SCNC: 27 MMOL/L — SIGNIFICANT CHANGE UP (ref 17–32)
CREAT SERPL-MCNC: 0.9 MG/DL — SIGNIFICANT CHANGE UP (ref 0.7–1.5)
CREAT SERPL-MCNC: 1 MG/DL — SIGNIFICANT CHANGE UP (ref 0.7–1.5)
EGFR: 103 ML/MIN/1.73M2 — SIGNIFICANT CHANGE UP
EGFR: 91 ML/MIN/1.73M2 — SIGNIFICANT CHANGE UP
EOSINOPHIL # BLD AUTO: 0.31 K/UL — SIGNIFICANT CHANGE UP (ref 0–0.7)
EOSINOPHIL # BLD AUTO: 0.43 K/UL — SIGNIFICANT CHANGE UP (ref 0–0.7)
EOSINOPHIL NFR BLD AUTO: 5.2 % — SIGNIFICANT CHANGE UP (ref 0–8)
EOSINOPHIL NFR BLD AUTO: 5.7 % — SIGNIFICANT CHANGE UP (ref 0–8)
GLUCOSE BLDC GLUCOMTR-MCNC: 115 MG/DL — HIGH (ref 70–99)
GLUCOSE BLDC GLUCOMTR-MCNC: 116 MG/DL — HIGH (ref 70–99)
GLUCOSE BLDC GLUCOMTR-MCNC: 136 MG/DL — HIGH (ref 70–99)
GLUCOSE BLDC GLUCOMTR-MCNC: 176 MG/DL — HIGH (ref 70–99)
GLUCOSE SERPL-MCNC: 113 MG/DL — HIGH (ref 70–99)
GLUCOSE SERPL-MCNC: 120 MG/DL — HIGH (ref 70–99)
HAV IGM SER-ACNC: SIGNIFICANT CHANGE UP
HBV CORE IGM SER-ACNC: SIGNIFICANT CHANGE UP
HBV SURFACE AG SER-ACNC: SIGNIFICANT CHANGE UP
HCT VFR BLD CALC: 38 % — LOW (ref 42–52)
HCT VFR BLD CALC: 40.1 % — LOW (ref 42–52)
HCV AB S/CO SERPL IA: 0.12 S/CO — SIGNIFICANT CHANGE UP (ref 0–0.99)
HCV AB SERPL-IMP: SIGNIFICANT CHANGE UP
HGB BLD-MCNC: 13.2 G/DL — LOW (ref 14–18)
HGB BLD-MCNC: 14 G/DL — SIGNIFICANT CHANGE UP (ref 14–18)
IMM GRANULOCYTES NFR BLD AUTO: 0.4 % — HIGH (ref 0.1–0.3)
IMM GRANULOCYTES NFR BLD AUTO: 0.5 % — HIGH (ref 0.1–0.3)
INR BLD: 1.06 RATIO — SIGNIFICANT CHANGE UP (ref 0.65–1.3)
LYMPHOCYTES # BLD AUTO: 1.46 K/UL — SIGNIFICANT CHANGE UP (ref 1.2–3.4)
LYMPHOCYTES # BLD AUTO: 1.93 K/UL — SIGNIFICANT CHANGE UP (ref 1.2–3.4)
LYMPHOCYTES # BLD AUTO: 24.6 % — SIGNIFICANT CHANGE UP (ref 20.5–51.1)
LYMPHOCYTES # BLD AUTO: 25.6 % — SIGNIFICANT CHANGE UP (ref 20.5–51.1)
MAGNESIUM SERPL-MCNC: 2.1 MG/DL — SIGNIFICANT CHANGE UP (ref 1.8–2.4)
MAGNESIUM SERPL-MCNC: 2.2 MG/DL — SIGNIFICANT CHANGE UP (ref 1.8–2.4)
MCHC RBC-ENTMCNC: 29.2 PG — SIGNIFICANT CHANGE UP (ref 27–31)
MCHC RBC-ENTMCNC: 29.3 PG — SIGNIFICANT CHANGE UP (ref 27–31)
MCHC RBC-ENTMCNC: 34.7 G/DL — SIGNIFICANT CHANGE UP (ref 32–37)
MCHC RBC-ENTMCNC: 34.9 G/DL — SIGNIFICANT CHANGE UP (ref 32–37)
MCV RBC AUTO: 83.7 FL — SIGNIFICANT CHANGE UP (ref 80–94)
MCV RBC AUTO: 84.4 FL — SIGNIFICANT CHANGE UP (ref 80–94)
MONOCYTES # BLD AUTO: 0.48 K/UL — SIGNIFICANT CHANGE UP (ref 0.1–0.6)
MONOCYTES # BLD AUTO: 0.67 K/UL — HIGH (ref 0.1–0.6)
MONOCYTES NFR BLD AUTO: 8.1 % — SIGNIFICANT CHANGE UP (ref 1.7–9.3)
MONOCYTES NFR BLD AUTO: 8.9 % — SIGNIFICANT CHANGE UP (ref 1.7–9.3)
NEUTROPHILS # BLD AUTO: 3.59 K/UL — SIGNIFICANT CHANGE UP (ref 1.4–6.5)
NEUTROPHILS # BLD AUTO: 4.4 K/UL — SIGNIFICANT CHANGE UP (ref 1.4–6.5)
NEUTROPHILS NFR BLD AUTO: 58.5 % — SIGNIFICANT CHANGE UP (ref 42.2–75.2)
NEUTROPHILS NFR BLD AUTO: 60.6 % — SIGNIFICANT CHANGE UP (ref 42.2–75.2)
NRBC # BLD: 0 /100 WBCS — SIGNIFICANT CHANGE UP (ref 0–0)
NRBC # BLD: 0 /100 WBCS — SIGNIFICANT CHANGE UP (ref 0–0)
PHOSPHATE SERPL-MCNC: 4.3 MG/DL — SIGNIFICANT CHANGE UP (ref 2.1–4.9)
PHOSPHATE SERPL-MCNC: 5 MG/DL — HIGH (ref 2.1–4.9)
PLATELET # BLD AUTO: 171 K/UL — SIGNIFICANT CHANGE UP (ref 130–400)
PLATELET # BLD AUTO: 199 K/UL — SIGNIFICANT CHANGE UP (ref 130–400)
PMV BLD: 13.6 FL — HIGH (ref 7.4–10.4)
PMV BLD: 13.6 FL — HIGH (ref 7.4–10.4)
POTASSIUM SERPL-MCNC: 4.4 MMOL/L — SIGNIFICANT CHANGE UP (ref 3.5–5)
POTASSIUM SERPL-MCNC: 4.8 MMOL/L — SIGNIFICANT CHANGE UP (ref 3.5–5)
POTASSIUM SERPL-SCNC: 4.4 MMOL/L — SIGNIFICANT CHANGE UP (ref 3.5–5)
POTASSIUM SERPL-SCNC: 4.8 MMOL/L — SIGNIFICANT CHANGE UP (ref 3.5–5)
PROT SERPL-MCNC: 6.1 G/DL — SIGNIFICANT CHANGE UP (ref 6–8)
PROT SERPL-MCNC: 6.2 G/DL — SIGNIFICANT CHANGE UP (ref 6–8)
PROTHROM AB SERPL-ACNC: 12.1 SEC — SIGNIFICANT CHANGE UP (ref 9.95–12.87)
RBC # BLD: 4.5 M/UL — LOW (ref 4.7–6.1)
RBC # BLD: 4.79 M/UL — SIGNIFICANT CHANGE UP (ref 4.7–6.1)
RBC # FLD: 19.2 % — HIGH (ref 11.5–14.5)
RBC # FLD: 19.2 % — HIGH (ref 11.5–14.5)
SODIUM SERPL-SCNC: 136 MMOL/L — SIGNIFICANT CHANGE UP (ref 135–146)
SODIUM SERPL-SCNC: 136 MMOL/L — SIGNIFICANT CHANGE UP (ref 135–146)
WBC # BLD: 5.93 K/UL — SIGNIFICANT CHANGE UP (ref 4.8–10.8)
WBC # BLD: 7.53 K/UL — SIGNIFICANT CHANGE UP (ref 4.8–10.8)
WBC # FLD AUTO: 5.93 K/UL — SIGNIFICANT CHANGE UP (ref 4.8–10.8)
WBC # FLD AUTO: 7.53 K/UL — SIGNIFICANT CHANGE UP (ref 4.8–10.8)

## 2024-05-26 PROCEDURE — 99233 SBSQ HOSP IP/OBS HIGH 50: CPT

## 2024-05-26 PROCEDURE — 99232 SBSQ HOSP IP/OBS MODERATE 35: CPT

## 2024-05-26 RX ADMIN — Medication 650 MILLIGRAM(S): at 00:00

## 2024-05-26 RX ADMIN — Medication 650 MILLIGRAM(S): at 06:35

## 2024-05-26 RX ADMIN — PIPERACILLIN AND TAZOBACTAM 25 GRAM(S): 4; .5 INJECTION, POWDER, LYOPHILIZED, FOR SOLUTION INTRAVENOUS at 02:50

## 2024-05-26 RX ADMIN — Medication 650 MILLIGRAM(S): at 06:29

## 2024-05-26 RX ADMIN — PIPERACILLIN AND TAZOBACTAM 25 GRAM(S): 4; .5 INJECTION, POWDER, LYOPHILIZED, FOR SOLUTION INTRAVENOUS at 08:39

## 2024-05-26 RX ADMIN — ENOXAPARIN SODIUM 40 MILLIGRAM(S): 100 INJECTION SUBCUTANEOUS at 21:32

## 2024-05-26 RX ADMIN — PIPERACILLIN AND TAZOBACTAM 25 GRAM(S): 4; .5 INJECTION, POWDER, LYOPHILIZED, FOR SOLUTION INTRAVENOUS at 13:29

## 2024-05-26 RX ADMIN — PIPERACILLIN AND TAZOBACTAM 25 GRAM(S): 4; .5 INJECTION, POWDER, LYOPHILIZED, FOR SOLUTION INTRAVENOUS at 21:31

## 2024-05-26 RX ADMIN — SODIUM CHLORIDE 140 MILLILITER(S): 9 INJECTION, SOLUTION INTRAVENOUS at 06:24

## 2024-05-26 RX ADMIN — Medication 25 MILLIGRAM(S): at 17:13

## 2024-05-26 RX ADMIN — Medication 25 MILLIGRAM(S): at 06:29

## 2024-05-26 NOTE — PROGRESS NOTE ADULT - ASSESSMENT
Assessment & Plan  49y Male s/p choledocholithiasis admitted for hepatic function and hemodynamic monitoring    NEURO:  #Acute pain    -APAP q6 ATC    RESP:   #Oxygenation    -tolerated RA    -encourage IS  #Activity    -ambulate as tolerated    CARDS:   #Hx of HTN    - continue metoprolol tartrate 25 every 12 hours (home dose metoprolol succinate 50 QD)    - holding home amlodipine-benazepril     GI/NUTR:   #Choledocholithiasis     - monitor for signs of cholangitis, trend LFTS    - GI following    - pending ERCP  #Diet, NPO Except Medications    -aspiration precautions, HOB 30  #GI Prophylaxis    -not indicated  #Bowel regimen    -not indicated    -last bowel movement 5/24 prior to admission    /RENAL:   #urine output in critically ill    - voiding freely  #maintain euvolemia    - on LR @ 140cc/hr  #2.3cm left renal mass incidental finding on CT    - possible outpatient urology followup may be needed    Labs:   BUN/Cr- 7/0.9  -->  Electrolytes-(05-25 @ 08:30)Na 137 // K 4.3 // Mg -- // Phos --        HEME/ONC:   #DVT prophylaxis    -enoxaparin Injectable  , SCDs    Labs: Hb/Hct:  14.7/43.1  -->                      Plts:  168  -->                 PTT/INR:  34.5/1.07  --->       ID:  #monitor for leukocytosis/fever  WBC- 6.60  --->>  Temp trend- 24hrs T(F): 98.5 (05-25 @ 06:49), Max: 98.5 (05-25 @ 06:49)  Current antibiotics - piperacillin/tazobactam IVPB.- 3.375 IV (05/25-    ENDO:  #Hx of DM    -FSG q6 while NPO    -Glucose goal 140-180    -if above 180 start ISS     HA1C     MSK:     Activity - Ambulate as Tolerated:     Time/Priority:  Routine (05-25-24 @ 13:08)    LINES/DRAINS:  PIV,     ADVANCED DIRECTIVES:  Full Code    HCP/Emergency Contact-    INDICATION FOR SDU: hemodynamic monitoring in setting of choledocholithiasis            DISPO:   SDU, Case discussed with Dr. Zabala   Assessment & Plan  49y Male s/p choledocholithiasis admitted for hepatic function and hemodynamic monitoring    NEURO:  #Acute pain    -APAP q6 ATC    RESP:   #Oxygenation    -tolerated RA    -encourage IS  #Activity    -ambulate as tolerated    CARDS:   #Hx of HTN    - continue metoprolol tartrate 25 every 12 hours (home dose metoprolol succinate 50 QD)    - holding home amlodipine-benazepril     GI/NUTR:   #Acute choledocholithiasis     - monitor for signs of cholangitis, trend LFTS    - downtrending T bili    - GI following --> possible ERCP on Tuesday 5/28  #Diet, Low fat, DASH, CC    -aspiration precautions, HOB 30  #GI Prophylaxis    -not indicated  #Bowel regimen    -not indicated    -last bowel movement 5/25    /RENAL:   #urine output in critically ill    - voiding freely  #2.3cm left renal mass incidental finding on CT    - possible outpatient urology followup may be needed    Labs:          BUN/Cr- 6/0.7  -->,  8/1.0  -->          Electrolytes-(05-26 @ 07:37)Na 136 // K 4.8 // Mg 2.2 // Phos 4.3     HEME/ONC:   #DVT prophylaxis    -enoxaparin Injectable  , SCDs    Labs: Hb/Hct:  13.9/39.5  -->,  14.0/40.1  -->                      Plts:  155  -->,  199  -->                 PTT/INR:        Home Rx:   T&S pending    ID:  #monitor for leukocytosis/fever  WBC- 6.60  --->>,  7.81  --->>,  7.53  --->>  Temp trend- 24hrs T(F): 98 (05-26 @ 08:00), Max: 98.8 (05-25 @ 19:32)  Current antibiotics - piperacillin/tazobactam IVPB.- 3.375 IV (05/25-    ENDO:  #Hx of DM    -Glucose goal 140-180    -if above 180 start ISS     HA1C     MSK:     Activity - Ambulate as Tolerated:     Time/Priority:  Routine (05-25-24 @ 13:08)    LINES/DRAINS:  PIV    ADVANCED DIRECTIVES:  Full Code    HCP/Emergency Contact-    INDICATION FOR SDU: hemodynamic monitoring in setting of choledocholithiasis         DISPO: dg to floor

## 2024-05-26 NOTE — CHART NOTE - NSCHARTNOTEFT_GEN_A_CORE
SICU Transfer Note    MEKHI RANGEL  49y (1975)  [id number]      Transfer from: SICU  Transfer to: TRAUMA    SICU COURSE: Patient remained hemodynamically stable throughout SICU course. Patient is ambulating and voiding. GI to follow for ERCP.    PAST MEDICAL & SURGICAL HISTORY:  HTN (hypertension)    No significant past surgical history        Allergies  No Known Allergies      MEDICATIONS  (STANDING):  acetaminophen     Tablet .. 650 milliGRAM(s) Oral every 6 hours  enoxaparin Injectable 40 milliGRAM(s) SubCutaneous every 24 hours  metoprolol tartrate 25 milliGRAM(s) Oral every 12 hours  piperacillin/tazobactam IVPB.- 3.375 Gram(s) IV Intermittent once  piperacillin/tazobactam IVPB.. 3.375 Gram(s) IV Intermittent every 8 hours    MEDICATIONS  (PRN):      Vital Signs Last 24 Hrs  T(C): 36.7 (26 May 2024 08:00), Max: 37.1 (25 May 2024 19:32)  T(F): 98 (26 May 2024 08:00), Max: 98.8 (25 May 2024 19:32)  HR: 62 (26 May 2024 08:00) (62 - 74)  BP: 125/81 (26 May 2024 08:00) (125/81 - 163/78)  BP(mean): 98 (26 May 2024 08:00) (93 - 101)  RR: 12 (26 May 2024 08:00) (12 - 26)  SpO2: 99% (26 May 2024 08:00) (95% - 100%)    Parameters below as of 25 May 2024 20:00  Patient On (Oxygen Delivery Method): room air      I&O's Summary    25 May 2024 07:01  -  26 May 2024 07:00  --------------------------------------------------------  IN: 1220 mL / OUT: 400 mL / NET: 820 mL      LABS:                        14.0   7.53  )-----------( 199      ( 26 May 2024 07:37 )             40.1       05-26    136  |  98  |  8<L>  ----------------------------<  120<H>  4.8   |  27  |  1.0    Ca    9.9      26 May 2024 07:37  Phos  4.3     05-26  Mg     2.2     05-26    TPro  6.2  /  Alb  3.9  /  TBili  19.8<HH>  /  DBili  16.4<H>  /  AST  84<H>  /  ALT  188<H>  /  AlkPhos  284<H>  05-26      PT/INR - ( 25 May 2024 08:30 )   PT: 12.20 sec;   INR: 1.07 ratio         PTT - ( 25 May 2024 08:30 )  PTT:34.5 sec        Assessment & Plan:  49y Male s/p choledocholithiasis admitted for hepatic function and hemodynamic monitoring    NEURO:  #Acute pain    -APAP q6 ATC    RESP:   #Oxygenation    -tolerated RA    -encourage IS  #Activity    -ambulate as tolerated    CARDS:   #Hx of HTN    - continue metoprolol tartrate 25 every 12 hours (home dose metoprolol succinate 50 QD)    - holding home amlodipine-benazepril     GI/NUTR:   #Acute choledocholithiasis     - monitor for signs of cholangitis, trend LFTS    - downtrending T bili    - GI following --> possible ERCP on Tuesday 5/28  #Diet, Low fat, DASH, CC    -aspiration precautions, HOB 30  #GI Prophylaxis    -not indicated  #Bowel regimen    -not indicated    -last bowel movement 5/25    /RENAL:   #urine output in critically ill    - voiding freely  #2.3cm left renal mass incidental finding on CT    - possible outpatient urology followup may be needed    Labs:          BUN/Cr- 6/0.7  -->,  8/1.0  -->          Electrolytes-(05-26 @ 07:37)Na 136 // K 4.8 // Mg 2.2 // Phos 4.3     HEME/ONC:   #DVT prophylaxis    -enoxaparin Injectable  , SCDs    Labs: Hb/Hct:  13.9/39.5  -->,  14.0/40.1  -->                      Plts:  155  -->,  199  -->                 PTT/INR:        Home Rx:   T&S pending    ID:  #monitor for leukocytosis/fever  WBC- 6.60  --->>,  7.81  --->>,  7.53  --->>  Temp trend- 24hrs T(F): 98 (05-26 @ 08:00), Max: 98.8 (05-25 @ 19:32)  Current antibiotics - piperacillin/tazobactam IVPB.- 3.375 IV (05/25-    ENDO:  #Hx of DM    -Glucose goal 140-180    -if above 180 start ISS     HA1C     MSK:     Activity - Ambulate as Tolerated:     Time/Priority:  Routine (05-25-24 @ 13:08)    LINES/DRAINS:  PIV    ADVANCED DIRECTIVES:  Full Code    HCP/Emergency Contact-    INDICATION FOR SDU: hemodynamic monitoring in setting of choledocholithiasis         DISPO: dg to floor      Follow Up:  -2000 labs  - trend LFTs  - f/u plan for ERCP with GI      Signed out to:   Date: 5/26/24  Time: SICU Transfer Note    MEKHI RANGEL  49y (1975)  [id number]      Transfer from: SICU  Transfer to: TRAUMA    SICU COURSE: Patient remained hemodynamically stable throughout SICU course. Patient is ambulating and voiding. GI to follow for ERCP.    PAST MEDICAL & SURGICAL HISTORY:  HTN (hypertension)    No significant past surgical history        Allergies  No Known Allergies      MEDICATIONS  (STANDING):  acetaminophen     Tablet .. 650 milliGRAM(s) Oral every 6 hours  enoxaparin Injectable 40 milliGRAM(s) SubCutaneous every 24 hours  metoprolol tartrate 25 milliGRAM(s) Oral every 12 hours  piperacillin/tazobactam IVPB.- 3.375 Gram(s) IV Intermittent once  piperacillin/tazobactam IVPB.. 3.375 Gram(s) IV Intermittent every 8 hours    MEDICATIONS  (PRN):      Vital Signs Last 24 Hrs  T(C): 36.7 (26 May 2024 08:00), Max: 37.1 (25 May 2024 19:32)  T(F): 98 (26 May 2024 08:00), Max: 98.8 (25 May 2024 19:32)  HR: 62 (26 May 2024 08:00) (62 - 74)  BP: 125/81 (26 May 2024 08:00) (125/81 - 163/78)  BP(mean): 98 (26 May 2024 08:00) (93 - 101)  RR: 12 (26 May 2024 08:00) (12 - 26)  SpO2: 99% (26 May 2024 08:00) (95% - 100%)    Parameters below as of 25 May 2024 20:00  Patient On (Oxygen Delivery Method): room air      I&O's Summary    25 May 2024 07:01  -  26 May 2024 07:00  --------------------------------------------------------  IN: 1220 mL / OUT: 400 mL / NET: 820 mL      LABS:                        14.0   7.53  )-----------( 199      ( 26 May 2024 07:37 )             40.1       05-26    136  |  98  |  8<L>  ----------------------------<  120<H>  4.8   |  27  |  1.0    Ca    9.9      26 May 2024 07:37  Phos  4.3     05-26  Mg     2.2     05-26    TPro  6.2  /  Alb  3.9  /  TBili  19.8<HH>  /  DBili  16.4<H>  /  AST  84<H>  /  ALT  188<H>  /  AlkPhos  284<H>  05-26      PT/INR - ( 25 May 2024 08:30 )   PT: 12.20 sec;   INR: 1.07 ratio         PTT - ( 25 May 2024 08:30 )  PTT:34.5 sec        Assessment & Plan:  49y Male s/p choledocholithiasis admitted for hepatic function and hemodynamic monitoring    NEURO:  #Acute pain    -APAP q6 ATC    RESP:   #Oxygenation    -tolerated RA    -encourage IS  #Activity    -ambulate as tolerated    CARDS:   #Hx of HTN    - continue metoprolol tartrate 25 every 12 hours (home dose metoprolol succinate 50 QD)    - holding home amlodipine-benazepril     GI/NUTR:   #Acute choledocholithiasis     - monitor for signs of cholangitis, trend LFTS    - downtrending T bili    - GI following --> possible ERCP on Tuesday 5/28  #Diet, Low fat, DASH, CC    -aspiration precautions, HOB 30  #GI Prophylaxis    -not indicated  #Bowel regimen    -not indicated    -last bowel movement 5/25    /RENAL:   #urine output in critically ill    - voiding freely  #2.3cm left renal mass incidental finding on CT    - possible outpatient urology followup may be needed    Labs:          BUN/Cr- 6/0.7  -->,  8/1.0  -->          Electrolytes-(05-26 @ 07:37)Na 136 // K 4.8 // Mg 2.2 // Phos 4.3     HEME/ONC:   #DVT prophylaxis    -enoxaparin Injectable  , SCDs    Labs: Hb/Hct:  13.9/39.5  -->,  14.0/40.1  -->                      Plts:  155  -->,  199  -->                 PTT/INR:        Home Rx:   T&S pending    ID:  #monitor for leukocytosis/fever  WBC- 6.60  --->>,  7.81  --->>,  7.53  --->>  Temp trend- 24hrs T(F): 98 (05-26 @ 08:00), Max: 98.8 (05-25 @ 19:32)  Current antibiotics - piperacillin/tazobactam IVPB.- 3.375 IV (05/25-    ENDO:  #Hx of DM    -Glucose goal 140-180    -if above 180 start ISS     HA1C     MSK:     Activity - Ambulate as Tolerated:     Time/Priority:  Routine (05-25-24 @ 13:08)    LINES/DRAINS:  PIV    ADVANCED DIRECTIVES:  Full Code    HCP/Emergency Contact-    INDICATION FOR SDU: hemodynamic monitoring in setting of choledocholithiasis         DISPO: dg to floor      Follow Up:  -2000 labs  - trend LFTs  - f/u plan for ERCP with GI      Signed out to: Dr. Warren  Date: 5/26/24  Time: 11:50

## 2024-05-26 NOTE — PROGRESS NOTE ADULT - ASSESSMENT
49 year old male with no known PMH presented to ER with worsening jaundice x3 weeks with myesha colored stools, dark urine. Patient reports that he has never seen a doctor for this in the past 3 weeks. He reports intermittent epigastric and RUQ discomfort/pain 1/10 . He is tolerating diet, but sometimes has nausea, no vomiting, fevers, chills. He also reports he had cholecystitis 6-7 years was recommended CCY but patient never followed up. Currently hemodynamically stable with cholestatic liver pattern. GI consulted for choledocholithiasis.     #Choledocholithiasis, no concern for cholangitis- mild downtrend in Bili  #Cholestatic liver injury likely CBD stone related  T darya 19.8     AST 84    05-26-24 @ 07:37  T darya 19.8     AST 96    05-25-24 @ 16:42  T darya 22.1         05-25-24 @ 08:30  - exam benign  - no concern for cholangitis  - WBC: 6k, afebrile  - hx of cholecystitis 6-7 years ago  - denies alcohol use, unintentional weight loss, active smoker  - lipase normal  - CTAP IC: moderate to severe intrahepatic biliary ductal   dilatation secondary to choledocholithiasis. More specifically, 1 cm   calcified gallstone is impacted within the CBD which measures up to 2 cm.   The CBD in the region of the pancreatic head is normal in caliber.   Cholelithiasis is noted as well.    RECS  - Will plan for EUS/ERCP tentatively 5/27, Keep NPO after midnight  - Trend LFTs, monitor for fevers// monitor CBC  - surgery following  - will follow    Communicated with primary team and patient

## 2024-05-26 NOTE — PROGRESS NOTE ADULT - SUBJECTIVE AND OBJECTIVE BOX
GENERAL SURGERY PROGRESS NOTE     MEKHI RANGEL  49y  Male  Hospital day :1d  POD:  Procedure:   OVERNIGHT EVENTS: patient's Tbili 19 from 22, remains jaundiced    T(F): 98.4 (05-25-24 @ 20:00), Max: 98.8 (05-25-24 @ 19:32)  HR: 65 (05-25-24 @ 20:00) (65 - 79)  BP: 143/78 (05-25-24 @ 20:00) (143/78 - 163/78)  ABP: --  ABP(mean): --  RR: 25 (05-25-24 @ 20:00) (18 - 26)  SpO2: 98% (05-25-24 @ 20:00) (98% - 100%)    DIET/FLUIDS: lactated ringers. 1000 milliLiter(s) IV Continuous <Continuous>    NG:                                                                              DRAINS:   BM:   EMESIS:   URINE:    GI proph:    AC/ proph: enoxaparin Injectable 40 milliGRAM(s) SubCutaneous every 24 hours    ABx: piperacillin/tazobactam IVPB.- 3.375 Gram(s) IV Intermittent once  piperacillin/tazobactam IVPB.- 3.375 Gram(s) IV Intermittent once  piperacillin/tazobactam IVPB.. 3.375 Gram(s) IV Intermittent every 8 hours      PHYSICAL EXAM:  GENERAL: NAD, jaundiced  CHEST/LUNG: Non-labored breathing  HEART: Regular rate and rhythm  ABDOMEN: Soft, slight RUQ tenderness, Nondistended;   EXTREMITIES:  No clubbing, cyanosis, or edema    LABS  Labs:  CAPILLARY BLOOD GLUCOSE      POCT Blood Glucose.: 116 mg/dL (26 May 2024 00:01)                          13.9   7.81  )-----------( 155      ( 25 May 2024 16:42 )             39.5       Auto Neutrophil %: 62.0 % (05-25-24 @ 16:42)  Auto Immature Granulocyte %: 0.5 % (05-25-24 @ 16:42)  Auto Neutrophil %: 69.5 % (05-25-24 @ 08:30)  Auto Immature Granulocyte %: 0.8 % (05-25-24 @ 08:30)    05-25    136  |  100  |  6<L>  ----------------------------<  151<H>  4.1   |  24  |  0.7      Calcium: 9.5 mg/dL (05-25-24 @ 16:42)      LFTs:             6.4  | 19.8 | 96       ------------------[270     ( 25 May 2024 16:42 )  4.0  | 16.0 | 220         Lipase:x      Amylase:x             Coags:     12.20  ----< 1.07    ( 25 May 2024 08:30 )     34.5                Urinalysis Basic - ( 25 May 2024 16:42 )    Color: x / Appearance: x / SG: x / pH: x  Gluc: 151 mg/dL / Ketone: x  / Bili: x / Urobili: x   Blood: x / Protein: x / Nitrite: x   Leuk Esterase: x / RBC: x / WBC x   Sq Epi: x / Non Sq Epi: x / Bacteria: x            RADIOLOGY & ADDITIONAL TESTS:      A/P

## 2024-05-26 NOTE — PROGRESS NOTE ADULT - SUBJECTIVE AND OBJECTIVE BOX
MEKHI RANGEL   [id number]   [birth year]  49yM    Admit Date: 05-25-24  Indication for SDU: Hemodynamic and hepatic function monitoring in setting of choledocholithiasis        ============================  HPI   49 year old male with PMH of HTN, cholecystitis (6 years ago), presents with jaundice. Patient reports that he noticed scleral icterus 3 weeks ago. For the past 3 weeks, patient has been experiencing dark urine and light, myesha colored stools. Patient has mild RUQ abdominal pain and nausea, not associated with food for the past 2 weeks. Patient denies fevers, chills, SOB, CP, vomiting. Patient is noncompliant to his blood pressure medications, reports stopped taking his blood pressure medications 3 days ago due to self decision making. Patient also used to take metformin years ago that he stopped himself. At bedside examination, patient has scleral icterus and jaundice throughout, abdomen soft, ND, NT.           < from: CT Abdomen and Pelvis w/ IV Cont (05.25.24 @ 08:42) >  HEPATOBILIARY: There is moderate to severe intrahepatic biliary ductal   dilatation secondary to choledocholithiasis. More specifically, 1 cm   calcified gallstone is impacted within the CBD which measures up to 2 cm.   The CBD in the region of the pancreatic head is normal in caliber.   Cholelithiasis is noted as well.      SICU Consult for Hemodynamic and hepatic function monitoring in setting of choledocholithiasis Patient evaluated in ED with VS /76, HR 75, O2 99% on RA, and temp of 98.6F. Patient appears comfortable, A&O x3. Skin appears significantly jaundiced and B/L scleral icterus noted. Abdomen noted to be soft, nondistended, and minimal RUQ tenderness. Patient is pending RUQ ultrasound. Patient currently not complaining of nausea or vomiting, last BM was 5/24. T bili is 22.1     24 Hour Events  5/25  NIGHT  -abdomen soft, NT, ND; jaundiced; A&O x 3  DAY  - 4pm labs stable  - t bili downtrend to 19  - started zosyn for empiric coverage per Dr. Andrea  - per GI will check AM labs and will plan further       [X] A ten-point review of systems was otherwise negative except as noted above.  [  ] Due to altered mental status/intubation, subjective information was not attained from the patient. History was obtained, to the extent possible, from review of the chart and collateral sources of information.    =========================================================================================================================================  =========================================================================================================================================   MEKHI RANGEL   [id number]   [birth year]  49yM    Admit Date: 05-25-24  Indication for SDU: Hemodynamic and hepatic function monitoring in setting of choledocholithiasis        ============================  HPI   49 year old male with PMH of HTN, cholecystitis (6 years ago), presents with jaundice. Patient reports that he noticed scleral icterus 3 weeks ago. For the past 3 weeks, patient has been experiencing dark urine and light, myesha colored stools. Patient has mild RUQ abdominal pain and nausea, not associated with food for the past 2 weeks. Patient denies fevers, chills, SOB, CP, vomiting. Patient is noncompliant to his blood pressure medications, reports stopped taking his blood pressure medications 3 days ago due to self decision making. Patient also used to take metformin years ago that he stopped himself. At bedside examination, patient has scleral icterus and jaundice throughout, abdomen soft, ND, NT.           < from: CT Abdomen and Pelvis w/ IV Cont (05.25.24 @ 08:42) >  HEPATOBILIARY: There is moderate to severe intrahepatic biliary ductal   dilatation secondary to choledocholithiasis. More specifically, 1 cm   calcified gallstone is impacted within the CBD which measures up to 2 cm.   The CBD in the region of the pancreatic head is normal in caliber.   Cholelithiasis is noted as well.      SICU Consult for Hemodynamic and hepatic function monitoring in setting of choledocholithiasis Patient evaluated in ED with VS /76, HR 75, O2 99% on RA, and temp of 98.6F. Patient appears comfortable, A&O x3. Skin appears significantly jaundiced and B/L scleral icterus noted. Abdomen noted to be soft, nondistended, and minimal RUQ tenderness. Patient is pending RUQ ultrasound. Patient currently not complaining of nausea or vomiting, last BM was 5/24. T bili is 22.1     24 Hour Events  5/25  NIGHT  -abdomen soft, NT, ND; jaundiced; A&O x 3  DAY  - 4pm labs stable  - t bili downtrend to 19  - started zosyn for empiric coverage per Dr. Andrea  - per GI will check AM labs and will plan further       [X] A ten-point review of systems was otherwise negative except as noted above.  [  ] Due to altered mental status/intubation, subjective information was not attained from the patient. History was obtained, to the extent possible, from review of the chart and collateral sources of information.    =========================================================================================================================================  =========================================================================================================================================  Daily     Daily   Diet, NPO:   Except Medications     Special Instructions for Nursing:  Except Medications (05-25-24 @ 13:08)    CURRENT MEDS:  Neurologic Medications  acetaminophen     Tablet .. 650 milliGRAM(s) Oral every 6 hours    Respiratory Medications    Cardiovascular Medications  metoprolol tartrate 25 milliGRAM(s) Oral every 12 hours    Gastrointestinal Medications  lactated ringers. 1000 milliLiter(s) IV Continuous <Continuous>    Genitourinary Medications    Hematologic/Oncologic Medications  enoxaparin Injectable 40 milliGRAM(s) SubCutaneous every 24 hours    Antimicrobial/Immunologic Medications  piperacillin/tazobactam IVPB.- 3.375 Gram(s) IV Intermittent once  piperacillin/tazobactam IVPB.. 3.375 Gram(s) IV Intermittent every 8 hours    Endocrine/Metabolic Medications    Topical/Other Medications    ICU Vital Signs Last 24 Hrs  T(C): 36.7 (26 May 2024 08:00), Max: 37.1 (25 May 2024 19:32)  T(F): 98 (26 May 2024 08:00), Max: 98.8 (25 May 2024 19:32)  HR: 62 (26 May 2024 08:00) (62 - 74)  BP: 125/81 (26 May 2024 08:00) (125/81 - 163/78)  BP(mean): 98 (26 May 2024 08:00) (93 - 101)  ABP: --  ABP(mean): --  RR: 12 (26 May 2024 08:00) (12 - 26)  SpO2: 99% (26 May 2024 08:00) (95% - 100%)    O2 Parameters below as of 25 May 2024 20:00  Patient On (Oxygen Delivery Method): room air      I&O's Summary    25 May 2024 07:01  -  26 May 2024 07:00  --------------------------------------------------------  IN: 1220 mL / OUT: 400 mL / NET: 820 mL      I&O's Detail    25 May 2024 07:01  -  26 May 2024 07:00  --------------------------------------------------------  IN:    IV PiggyBack: 100 mL    Lactated Ringers: 1120 mL  Total IN: 1220 mL    OUT:    Voided (mL): 400 mL  Total OUT: 400 mL    Total NET: 820 mL    PHYSICAL EXAM:     a&ox3, b/l scleral icterus noted  follows commands, ROGERS  no acute distress  equal chest rise b/l, clear breath sounds b/l  abdomen soft, nontender, nondistended  extremities soft  skin is significantly jaundiced

## 2024-05-26 NOTE — PROGRESS NOTE ADULT - ATTENDING COMMENTS
The patient was seen and examined.  Agree with the assessment and plan as detailed by the team.  I edited the note where necessary.

## 2024-05-26 NOTE — PROGRESS NOTE ADULT - ATTENDING COMMENTS
Patient is AAO x4  Has minimal abdominal discomfort.  T.darya 19 from 22 today.  NPO for GI procedure.    PE:  Abdomen: soft     ASSESSMENT:  50 y/o male with Choledocholithiasis.  Dilated CBD.  Elevated LFTs.  Obstructive jaundice.  At risk for hemodynamic instability.    PLAN:  - incentive spirometer  - keep MAP>65; on ivf  - GI eval for ERCP - for now they are postponing it  - follow LFTs  - start low fat diet  - follow serum electrolytes and UOP  - ID - put on empiric Zosyn  - DVT prophylaxis

## 2024-05-26 NOTE — PROGRESS NOTE ADULT - ASSESSMENT
49yM w/ PMHx of  HTN, cholecystitis (6 years ago)  who presented with chief complaint of jaundice. Physical exam findings, imaging, and labs as documented above.     PLAN:  - Close hemodynamic monitoring   - NPO, IVF   - IV ABX  - Trend LFTs, tbili 19 from 22  - GI following for ERCP

## 2024-05-26 NOTE — PROGRESS NOTE ADULT - SUBJECTIVE AND OBJECTIVE BOX
Gastroenterology progress note:     Patient is a 49y old  Male who presents with a chief complaint of      Admitted on: 05-25-24    We are following the patient for: Choledocholithiasis       Interval History: Patient's LFTs with a slight downtrend, vitally stable, asymptomatic, tolerating regular diet.  Patient trying to go away from the floor for smoking    No acute events overnight.       PAST MEDICAL & SURGICAL HISTORY:  HTN (hypertension)      No significant past surgical history          MEDICATIONS  (STANDING):  acetaminophen     Tablet .. 650 milliGRAM(s) Oral every 6 hours  enoxaparin Injectable 40 milliGRAM(s) SubCutaneous every 24 hours  metoprolol tartrate 25 milliGRAM(s) Oral every 12 hours  piperacillin/tazobactam IVPB.. 3.375 Gram(s) IV Intermittent every 8 hours    MEDICATIONS  (PRN):      Allergies  No Known Allergies      Review of Systems:   Cardiovascular:  No Chest Pain, No Palpitations  Respiratory:  No Cough, No Dyspnea  Gastrointestinal:  As described in HPI  Skin:  No Skin Lesions, No Jaundice  Neuro:  No Syncope, No Dizziness    Physical Examination:  T(C): 36.7 (05-26-24 @ 08:00), Max: 37.1 (05-25-24 @ 19:32)  HR: 62 (05-26-24 @ 08:00) (62 - 74)  BP: 125/81 (05-26-24 @ 08:00) (125/81 - 163/78)  RR: 12 (05-26-24 @ 08:00) (12 - 26)  SpO2: 99% (05-26-24 @ 08:00) (95% - 100%)      05-25-24 @ 07:01  -  05-26-24 @ 07:00  --------------------------------------------------------  IN: 1220 mL / OUT: 400 mL / NET: 820 mL        GENERAL: AAOx3, no acute distress.  HEAD:  Atraumatic, Normocephalic  EYES: conjunctiva and sclera clear  NECK: Supple, no JVD or thyromegaly  CHEST/LUNG: Clear to auscultation bilaterally; No wheeze, rhonchi, or rales  HEART: Regular rate and rhythm; normal S1, S2, No murmurs.  ABDOMEN: Soft, nontender, nondistended; Bowel sounds present  NEUROLOGY: No asterixis or tremor.   SKIN: Intact, no jaundice     Data:                        14.0   7.53  )-----------( 199      ( 26 May 2024 07:37 )             40.1     Hgb trend:  14.0  05-26-24 @ 07:37  13.9  05-25-24 @ 16:42  14.7  05-25-24 @ 08:30        05-26    136  |  98  |  8<L>  ----------------------------<  120<H>  4.8   |  27  |  1.0    Ca    9.9      26 May 2024 07:37  Phos  4.3     05-26  Mg     2.2     05-26    TPro  6.2  /  Alb  3.9  /  TBili  19.8<HH>  /  DBili  16.4<H>  /  AST  84<H>  /  ALT  188<H>  /  AlkPhos  284<H>  05-26    Liver panel trend:  TBili 19.8   /   AST 84   /      /   AlkP 284   /   Tptn 6.2   /   Alb 3.9    /   DBili 16.4      05-26  TBili 19.8   /   AST 96   /      /   AlkP 270   /   Tptn 6.4   /   Alb 4.0    /   DBili 16.0      05-25  TBili 22.1   /      /      /   AlkP 318   /   Tptn 7.3   /   Alb 4.4    /   DBili >17.2      05-25      PT/INR - ( 25 May 2024 08:30 )   PT: 12.20 sec;   INR: 1.07 ratio         PTT - ( 25 May 2024 08:30 )  PTT:34.5 sec       Radiology:    US Abdomen Upper Quadrant Right:   ACC: [id number] EXAM:  US ABDOMEN RT UPR QUADRANT   ORDERED BY: NATALIE HANDY     PROCEDURE DATE:  05/25/2024          INTERPRETATION:  CLINICAL INFORMATION: Choledocholithiasis    COMPARISON: CT dated 5/25/2024    TECHNIQUE: Sonography of the right upper quadrant.    FINDINGS:  Liver: Within normal limits.  Bile ducts: Intrahepatic and extrahepatic biliary ductal dilation.   Gallstone within a dilated common bile duct measuring up to 1.2 cm.  Gallbladder: Cholelithiasis. No gallbladder wall thickening or   pericholecystic fluid.  Pancreas: Visualized portions are within normal limits.  Right kidney: 12.4 cm. No hydronephrosis.  Ascites: None.  IVC: Visualized portions are within normal limits.      IMPRESSION:      Cholelithiasis without sonographic evidence of acute cholecystitis.    Choledocholithiasis with a dilated common bile duct.    --- End of Report ---          JUAN BARCENAS MD; Resident Radiologist  This document has been electronically signed.  BRANDY GERARDO MD; Attending Radiologist  This document has been electronically signed. May 25 2024  2:45PM (05-25-24 @ 13:39)     Gastroenterology progress note:     Patient is a 49y old  Male who presents with a chief complaint of jaundice     Admitted on: 05-25-24    We are following the patient for: Choledocholithiasis       Interval History: Patient's LFTs with a slight downtrend, vitally stable, asymptomatic, tolerating regular diet.  Patient trying to go away from the floor for smoking    No acute events overnight.       PAST MEDICAL & SURGICAL HISTORY:  HTN (hypertension)      No significant past surgical history          MEDICATIONS  (STANDING):  acetaminophen     Tablet .. 650 milliGRAM(s) Oral every 6 hours  enoxaparin Injectable 40 milliGRAM(s) SubCutaneous every 24 hours  metoprolol tartrate 25 milliGRAM(s) Oral every 12 hours  piperacillin/tazobactam IVPB.. 3.375 Gram(s) IV Intermittent every 8 hours    MEDICATIONS  (PRN):      Allergies  No Known Allergies      Review of Systems:   Cardiovascular:  No Chest Pain, No Palpitations  Respiratory:  No Cough, No Dyspnea  Gastrointestinal:  As described in HPI  Skin:  No Skin Lesions, No Jaundice  Neuro:  No Syncope, No Dizziness    Physical Examination:  T(C): 36.7 (05-26-24 @ 08:00), Max: 37.1 (05-25-24 @ 19:32)  HR: 62 (05-26-24 @ 08:00) (62 - 74)  BP: 125/81 (05-26-24 @ 08:00) (125/81 - 163/78)  RR: 12 (05-26-24 @ 08:00) (12 - 26)  SpO2: 99% (05-26-24 @ 08:00) (95% - 100%)      05-25-24 @ 07:01  -  05-26-24 @ 07:00  --------------------------------------------------------  IN: 1220 mL / OUT: 400 mL / NET: 820 mL        GENERAL: AAOx3, no acute distress.  HEAD:  Atraumatic, Normocephalic  EYES: conjunctiva and sclera clear  NECK: Supple, no JVD or thyromegaly  CHEST/LUNG: Clear to auscultation bilaterally; No wheeze, rhonchi, or rales  HEART: Regular rate and rhythm; normal S1, S2, No murmurs.  ABDOMEN: Soft, nontender, nondistended; Bowel sounds present  NEUROLOGY: No asterixis or tremor.   SKIN: Intact, no jaundice     Data:                        14.0   7.53  )-----------( 199      ( 26 May 2024 07:37 )             40.1     Hgb trend:  14.0  05-26-24 @ 07:37  13.9  05-25-24 @ 16:42  14.7  05-25-24 @ 08:30        05-26    136  |  98  |  8<L>  ----------------------------<  120<H>  4.8   |  27  |  1.0    Ca    9.9      26 May 2024 07:37  Phos  4.3     05-26  Mg     2.2     05-26    TPro  6.2  /  Alb  3.9  /  TBili  19.8<HH>  /  DBili  16.4<H>  /  AST  84<H>  /  ALT  188<H>  /  AlkPhos  284<H>  05-26    Liver panel trend:  TBili 19.8   /   AST 84   /      /   AlkP 284   /   Tptn 6.2   /   Alb 3.9    /   DBili 16.4      05-26  TBili 19.8   /   AST 96   /      /   AlkP 270   /   Tptn 6.4   /   Alb 4.0    /   DBili 16.0      05-25  TBili 22.1   /      /      /   AlkP 318   /   Tptn 7.3   /   Alb 4.4    /   DBili >17.2      05-25      PT/INR - ( 25 May 2024 08:30 )   PT: 12.20 sec;   INR: 1.07 ratio         PTT - ( 25 May 2024 08:30 )  PTT:34.5 sec       Radiology:    US Abdomen Upper Quadrant Right:   ACC: [id number] EXAM:  US ABDOMEN RT UPR QUADRANT   ORDERED BY: NATALIE HANDY     PROCEDURE DATE:  05/25/2024          INTERPRETATION:  CLINICAL INFORMATION: Choledocholithiasis    COMPARISON: CT dated 5/25/2024    TECHNIQUE: Sonography of the right upper quadrant.    FINDINGS:  Liver: Within normal limits.  Bile ducts: Intrahepatic and extrahepatic biliary ductal dilation.   Gallstone within a dilated common bile duct measuring up to 1.2 cm.  Gallbladder: Cholelithiasis. No gallbladder wall thickening or   pericholecystic fluid.  Pancreas: Visualized portions are within normal limits.  Right kidney: 12.4 cm. No hydronephrosis.  Ascites: None.  IVC: Visualized portions are within normal limits.      IMPRESSION:      Cholelithiasis without sonographic evidence of acute cholecystitis.    Choledocholithiasis with a dilated common bile duct.    --- End of Report ---          JUAN BARCENAS MD; Resident Radiologist  This document has been electronically signed.  BRANDY GERARDO MD; Attending Radiologist  This document has been electronically signed. May 25 2024  2:45PM (05-25-24 @ 13:39)

## 2024-05-27 ENCOUNTER — TRANSCRIPTION ENCOUNTER (OUTPATIENT)
Age: 49
End: 2024-05-27

## 2024-05-27 LAB
A1C WITH ESTIMATED AVERAGE GLUCOSE RESULT: 6.8 % — HIGH (ref 4–5.6)
ALBUMIN SERPL ELPH-MCNC: 3.5 G/DL — SIGNIFICANT CHANGE UP (ref 3.5–5.2)
ALP SERPL-CCNC: 243 U/L — HIGH (ref 30–115)
ALT FLD-CCNC: 149 U/L — HIGH (ref 0–41)
ANION GAP SERPL CALC-SCNC: 13 MMOL/L — SIGNIFICANT CHANGE UP (ref 7–14)
AST SERPL-CCNC: 61 U/L — HIGH (ref 0–41)
BASOPHILS # BLD AUTO: 0 K/UL — SIGNIFICANT CHANGE UP (ref 0–0.2)
BASOPHILS NFR BLD AUTO: 0 % — SIGNIFICANT CHANGE UP (ref 0–1)
BILIRUB DIRECT SERPL-MCNC: 11.8 MG/DL — SIGNIFICANT CHANGE UP (ref 0–0.3)
BILIRUB INDIRECT FLD-MCNC: 4 MG/DL — SIGNIFICANT CHANGE UP (ref 0.2–1.2)
BILIRUB SERPL-MCNC: 15.8 MG/DL — CRITICAL HIGH (ref 0.2–1.2)
BUN SERPL-MCNC: 10 MG/DL — SIGNIFICANT CHANGE UP (ref 10–20)
CALCIUM SERPL-MCNC: 9.1 MG/DL — SIGNIFICANT CHANGE UP (ref 8.4–10.5)
CHLORIDE SERPL-SCNC: 97 MMOL/L — LOW (ref 98–110)
CO2 SERPL-SCNC: 24 MMOL/L — SIGNIFICANT CHANGE UP (ref 17–32)
CREAT SERPL-MCNC: 0.9 MG/DL — SIGNIFICANT CHANGE UP (ref 0.7–1.5)
EGFR: 103 ML/MIN/1.73M2 — SIGNIFICANT CHANGE UP
EOSINOPHIL # BLD AUTO: 0.01 K/UL — SIGNIFICANT CHANGE UP (ref 0–0.7)
EOSINOPHIL NFR BLD AUTO: 0.1 % — SIGNIFICANT CHANGE UP (ref 0–8)
ESTIMATED AVERAGE GLUCOSE: 148 MG/DL — HIGH (ref 68–114)
GLUCOSE BLDC GLUCOMTR-MCNC: 131 MG/DL — HIGH (ref 70–99)
GLUCOSE BLDC GLUCOMTR-MCNC: 160 MG/DL — HIGH (ref 70–99)
GLUCOSE BLDC GLUCOMTR-MCNC: 183 MG/DL — HIGH (ref 70–99)
GLUCOSE BLDC GLUCOMTR-MCNC: 219 MG/DL — HIGH (ref 70–99)
GLUCOSE BLDC GLUCOMTR-MCNC: 238 MG/DL — HIGH (ref 70–99)
GLUCOSE SERPL-MCNC: 225 MG/DL — HIGH (ref 70–99)
HCT VFR BLD CALC: 35.7 % — LOW (ref 42–52)
HGB BLD-MCNC: 12.5 G/DL — LOW (ref 14–18)
IMM GRANULOCYTES NFR BLD AUTO: 0.5 % — HIGH (ref 0.1–0.3)
LYMPHOCYTES # BLD AUTO: 0.92 K/UL — LOW (ref 1.2–3.4)
LYMPHOCYTES # BLD AUTO: 10.4 % — LOW (ref 20.5–51.1)
MAGNESIUM SERPL-MCNC: 2 MG/DL — SIGNIFICANT CHANGE UP (ref 1.8–2.4)
MCHC RBC-ENTMCNC: 29.2 PG — SIGNIFICANT CHANGE UP (ref 27–31)
MCHC RBC-ENTMCNC: 35 G/DL — SIGNIFICANT CHANGE UP (ref 32–37)
MCV RBC AUTO: 83.4 FL — SIGNIFICANT CHANGE UP (ref 80–94)
MONOCYTES # BLD AUTO: 0.31 K/UL — SIGNIFICANT CHANGE UP (ref 0.1–0.6)
MONOCYTES NFR BLD AUTO: 3.5 % — SIGNIFICANT CHANGE UP (ref 1.7–9.3)
NEUTROPHILS # BLD AUTO: 7.56 K/UL — HIGH (ref 1.4–6.5)
NEUTROPHILS NFR BLD AUTO: 85.5 % — HIGH (ref 42.2–75.2)
NRBC # BLD: 0 /100 WBCS — SIGNIFICANT CHANGE UP (ref 0–0)
PHOSPHATE SERPL-MCNC: 3.7 MG/DL — SIGNIFICANT CHANGE UP (ref 2.1–4.9)
PLATELET # BLD AUTO: 172 K/UL — SIGNIFICANT CHANGE UP (ref 130–400)
PMV BLD: 13.3 FL — HIGH (ref 7.4–10.4)
POTASSIUM SERPL-MCNC: 4.3 MMOL/L — SIGNIFICANT CHANGE UP (ref 3.5–5)
POTASSIUM SERPL-SCNC: 4.3 MMOL/L — SIGNIFICANT CHANGE UP (ref 3.5–5)
PROT SERPL-MCNC: 5.8 G/DL — LOW (ref 6–8)
RBC # BLD: 4.28 M/UL — LOW (ref 4.7–6.1)
RBC # FLD: 18.5 % — HIGH (ref 11.5–14.5)
SODIUM SERPL-SCNC: 134 MMOL/L — LOW (ref 135–146)
WBC # BLD: 8.84 K/UL — SIGNIFICANT CHANGE UP (ref 4.8–10.8)
WBC # FLD AUTO: 8.84 K/UL — SIGNIFICANT CHANGE UP (ref 4.8–10.8)

## 2024-05-27 PROCEDURE — 43262 ENDO CHOLANGIOPANCREATOGRAPH: CPT | Mod: XU

## 2024-05-27 PROCEDURE — 74328 X-RAY BILE DUCT ENDOSCOPY: CPT | Mod: 26

## 2024-05-27 PROCEDURE — 43264 ERCP REMOVE DUCT CALCULI: CPT | Mod: XU

## 2024-05-27 PROCEDURE — 43274 ERCP DUCT STENT PLACEMENT: CPT

## 2024-05-27 RX ORDER — DEXTROSE 50 % IN WATER 50 %
12.5 SYRINGE (ML) INTRAVENOUS ONCE
Refills: 0 | Status: DISCONTINUED | OUTPATIENT
Start: 2024-05-27 | End: 2024-05-28

## 2024-05-27 RX ORDER — INSULIN LISPRO 100/ML
VIAL (ML) SUBCUTANEOUS
Refills: 0 | Status: DISCONTINUED | OUTPATIENT
Start: 2024-05-27 | End: 2024-05-28

## 2024-05-27 RX ORDER — DEXTROSE 50 % IN WATER 50 %
15 SYRINGE (ML) INTRAVENOUS ONCE
Refills: 0 | Status: DISCONTINUED | OUTPATIENT
Start: 2024-05-27 | End: 2024-05-28

## 2024-05-27 RX ORDER — GLUCAGON INJECTION, SOLUTION 0.5 MG/.1ML
1 INJECTION, SOLUTION SUBCUTANEOUS ONCE
Refills: 0 | Status: DISCONTINUED | OUTPATIENT
Start: 2024-05-27 | End: 2024-05-28

## 2024-05-27 RX ORDER — SODIUM CHLORIDE 9 MG/ML
1000 INJECTION, SOLUTION INTRAVENOUS
Refills: 0 | Status: DISCONTINUED | OUTPATIENT
Start: 2024-05-27 | End: 2024-05-28

## 2024-05-27 RX ORDER — DEXTROSE 10 % IN WATER 10 %
125 INTRAVENOUS SOLUTION INTRAVENOUS ONCE
Refills: 0 | Status: DISCONTINUED | OUTPATIENT
Start: 2024-05-27 | End: 2024-05-28

## 2024-05-27 RX ORDER — INDOMETHACIN 50 MG
50 CAPSULE ORAL ONCE
Refills: 0 | Status: COMPLETED | OUTPATIENT
Start: 2024-05-27 | End: 2024-05-27

## 2024-05-27 RX ORDER — DEXTROSE 50 % IN WATER 50 %
25 SYRINGE (ML) INTRAVENOUS ONCE
Refills: 0 | Status: DISCONTINUED | OUTPATIENT
Start: 2024-05-27 | End: 2024-05-28

## 2024-05-27 RX ADMIN — Medication 25 MILLIGRAM(S): at 17:51

## 2024-05-27 RX ADMIN — Medication 650 MILLIGRAM(S): at 12:36

## 2024-05-27 RX ADMIN — SODIUM CHLORIDE 120 MILLILITER(S): 9 INJECTION, SOLUTION INTRAVENOUS at 17:51

## 2024-05-27 RX ADMIN — SODIUM CHLORIDE 120 MILLILITER(S): 9 INJECTION, SOLUTION INTRAVENOUS at 06:29

## 2024-05-27 RX ADMIN — PIPERACILLIN AND TAZOBACTAM 25 GRAM(S): 4; .5 INJECTION, POWDER, LYOPHILIZED, FOR SOLUTION INTRAVENOUS at 05:17

## 2024-05-27 RX ADMIN — Medication 25 MILLIGRAM(S): at 05:17

## 2024-05-27 RX ADMIN — Medication 650 MILLIGRAM(S): at 12:06

## 2024-05-27 RX ADMIN — PIPERACILLIN AND TAZOBACTAM 25 GRAM(S): 4; .5 INJECTION, POWDER, LYOPHILIZED, FOR SOLUTION INTRAVENOUS at 14:44

## 2024-05-27 RX ADMIN — Medication 50 MILLIGRAM(S): at 09:55

## 2024-05-27 RX ADMIN — ENOXAPARIN SODIUM 40 MILLIGRAM(S): 100 INJECTION SUBCUTANEOUS at 21:18

## 2024-05-27 RX ADMIN — PIPERACILLIN AND TAZOBACTAM 25 GRAM(S): 4; .5 INJECTION, POWDER, LYOPHILIZED, FOR SOLUTION INTRAVENOUS at 21:17

## 2024-05-27 NOTE — CHART NOTE - NSCHARTNOTEFT_GEN_A_CORE
PACU ANESTHESIA ADMISSION NOTE      Procedure: ERCP with stent placement  Post op diagnosis:  biliary stone      __x__  Patent Airway    __x__  Full return of protective reflexes    __x__  Full recovery from anesthesia / back to baseline     Vitals:   T:  97.3         R:  15                BP:  156/70                Sat:   98%                P: 85      Mental Status:  __x__ Awake   _____ Alert   _____ Drowsy   _____ Sedated    Nausea/Vomiting:  ____ NO  ______Yes,   See Post - Op Orders          Pain Scale (0-10):  _____    Treatment: ____ None    ___x_ See Post - Op/PCA Orders    Post - Operative Fluids:   ____ Oral   __x__ See Post - Op Orders    Plan: Discharge:   ____Home       ____x_Floor     _____Critical Care    _____  Other:_________________    Comments: Pt tolerated procedure well, no anesthesia related complications. Care of pt endorsed to PACU, report given to PACU RN. Discharge when criteria are met.

## 2024-05-27 NOTE — PROGRESS NOTE ADULT - SUBJECTIVE AND OBJECTIVE BOX
GENERAL SURGERY PROGRESS NOTE    Patient: MEKHI RANGEL , 49y ([birth year])Male   MRN: [id number]  Location: [address]  Visit: 05-25-24 Inpatient  Date: 05-27-24 @ 13:16    Hospital Day #:  3     PROCEDURE/DX/INJURIES:   Choledocholithiasis     INTERVAL HX:   No acute events overnight.   Afebrile, VSS. Abd soft, nontender, nondistended.   Tbili trending up (19 > 21).     VITALS:   T(F): 97.3 (05-27-24 @ 11:14), Max: 98.8 (05-27-24 @ 06:54)  HR: 80 (05-27-24 @ 11:29)  BP: 114/71 (05-27-24 @ 11:29)  RR: 18 (05-27-24 @ 11:29)  SpO2: 100% (05-27-24 @ 11:29)      I & O's:    05-26-24 @ 07:01  -  05-27-24 @ 07:00  --------------------------------------------------------  IN:    IV PiggyBack: 375 mL    Lactated Ringers: 420 mL    Lactated Ringers: 240 mL    Oral Fluid: 840 mL  Total IN: 1875 mL    OUT:    Voided (mL): 1350 mL  Total OUT: 1350 mL    Total NET: 525 mL    PHYSICAL EXAM:  GENERAL: NAD, AAOx3, calm and cooperative  HEENT: EOMI, sclera non-icteric.  HEART: Regular rate   LUNGS: Breathing comfortably  ABDOMEN:  Soft, nontender, nondistended.  MSK/EXTREMITIES: Warm & well-perfused.   SKIN: Warm, dry. No jaundice.       LAB/STUDIES:                       13.2   5.93  )-----------( 171      ( 26 May 2024 21:42 )             38.0     05-26    136  |  97<L>  |  11  ----------------------------<  113<H>  4.4   |  27  |  0.9    Ca    9.3      26 May 2024 21:42  Phos  5.0<H>     05-26  Mg     2.1     05-26    TPro  6.1  /  Alb  3.7  /  TBili  21.0<HH>  /  DBili  15.3<H>  /  AST  76<H>  /  ALT  178<H>  /  AlkPhos  268<H>  05-26    IMAGING: No new imaging obtained in the past 24h***    ACCESS/ DEVICES:  [X] Peripheral IV  [ ] Central Venous Line	[ ] R	[ ] L	[ ] IJ	[ ] Fem	[ ] SC	Placed:   [ ] Arterial Line		[ ] R	[ ] L	[ ] Fem	[ ] Rad	[ ] Ax	Placed:   [ ] PICC:					[ ] Mediport  [ ] Urinary Catheter,  Date Placed:   [ ] HITESH/Kumar Drains     [ ] Wound Vac   [ ] Nasogastric Tube  [ ] Ostomy

## 2024-05-27 NOTE — PROGRESS NOTE ADULT - ASSESSMENT
49y M admitted for choledocholithiasis (Tbili >20). Downgraded from SICU to floor on 5/26.     # Choledocholithiasis   - ERCP w/ GI this AM -- will f/u results   - Currently NPO for procedure, LR @ 120 cc/hr   - PPX: Lovenox 40 QD   - 20:00 labs including LFTs;  replete electrolytes as needed   - IV Zosyn   ________________________  Surgery - [facility] Team  [phone]

## 2024-05-27 NOTE — CHART NOTE - NSCHARTNOTEFT_GEN_A_CORE
ERCP with sphincterotomy: A stone was seen in the cystic duct using fluroscopy. A [id number] CBD Plastic stent was placed with return of sludge and pus.    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 A [id number] CBD Plastic stent was placed with return of sludge and pus.    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

## 2024-05-27 NOTE — PROVIDER CONTACT NOTE (OTHER) - SITUATION
patient refusing insulin at this time. fingerstick rechecked and is 183
patient's fingerstick is elevated at 219. patient is not due for any insulin at this time

## 2024-05-27 NOTE — PRE-ANESTHESIA EVALUATION ADULT - NSANTHRISKNONERD_GEN_ALL_CORE
No risk alerts present
Pt A&Ox4, presents to the ED c/o worsening cough. Pt diagnosed with covid yesterday. Pt scheduled for MAB treatment at 8am at [facility]. Pt states "I can not handle my symptoms, I want the transfusion sooner." Pt denies CP. No respiratory distress noted.

## 2024-05-28 ENCOUNTER — TRANSCRIPTION ENCOUNTER (OUTPATIENT)
Age: 49
End: 2024-05-28

## 2024-05-28 VITALS
SYSTOLIC BLOOD PRESSURE: 121 MMHG | HEART RATE: 78 BPM | DIASTOLIC BLOOD PRESSURE: 81 MMHG | OXYGEN SATURATION: 97 % | RESPIRATION RATE: 17 BRPM | TEMPERATURE: 98 F

## 2024-05-28 LAB
A1C WITH ESTIMATED AVERAGE GLUCOSE RESULT: 6.7 % — HIGH (ref 4–5.6)
ESTIMATED AVERAGE GLUCOSE: 146 MG/DL — HIGH (ref 68–114)
GLUCOSE BLDC GLUCOMTR-MCNC: 125 MG/DL — HIGH (ref 70–99)
GLUCOSE BLDC GLUCOMTR-MCNC: 207 MG/DL — HIGH (ref 70–99)

## 2024-05-28 PROCEDURE — 99238 HOSP IP/OBS DSCHRG MGMT 30/<: CPT

## 2024-05-28 PROCEDURE — 99232 SBSQ HOSP IP/OBS MODERATE 35: CPT

## 2024-05-28 RX ORDER — ACETAMINOPHEN 500 MG
2 TABLET ORAL
Qty: 0 | Refills: 0 | DISCHARGE
Start: 2024-05-28

## 2024-05-28 RX ADMIN — SODIUM CHLORIDE 120 MILLILITER(S): 9 INJECTION, SOLUTION INTRAVENOUS at 01:52

## 2024-05-28 RX ADMIN — Medication 25 MILLIGRAM(S): at 05:23

## 2024-05-28 RX ADMIN — PIPERACILLIN AND TAZOBACTAM 25 GRAM(S): 4; .5 INJECTION, POWDER, LYOPHILIZED, FOR SOLUTION INTRAVENOUS at 05:22

## 2024-05-28 NOTE — DISCHARGE NOTE PROVIDER - NSDCCPCAREPLAN_GEN_ALL_CORE_FT
PRINCIPAL DISCHARGE DIAGNOSIS  Diagnosis: Jaundice  Assessment and Plan of Treatment: Diet: Continue low fat diet  Activity: Ambulate and get out of bed as tolerated, and with assistance if feeling weak.  Pain: You can take over the counter medications such as Tylenol, and Ibuprofen for pain control. Please adhere to the instructions on the back of the bottle.   Follow up:   - Surgery: Please follow up with Dr. Gonzales, or a surgeon of your own choosing, in 1-2 weeks to discuss elective cholecystectomy. Please call [phone] to make an appointment.  - Gastroenterology: Please follow up with Dr. Cooper Fletcher in 3-4 weeks for stent removal. Please call [phone] to schedule an appointment.   - Primary care provider: Please follow up with your PCP as soon as possible (within 1 week) regarding diabetes management (A1C 6.7 and elevated fingersticks to 200s).   If you develop fevers, chills, worsening pain, nausea that won't subside, vomiting, or any other symptoms of concern, please call MD or return to the ED for further advice, evaluation, and/or treatment.      SECONDARY DISCHARGE DIAGNOSES  Diagnosis: Choledocholithiasis with obstruction  Assessment and Plan of Treatment:     Diagnosis: Transaminitis  Assessment and Plan of Treatment:      PRINCIPAL DISCHARGE DIAGNOSIS  Diagnosis: Jaundice  Assessment and Plan of Treatment: Diet: Continue low fat diet  Activity: Ambulate and get out of bed as tolerated, and with assistance if feeling weak.  Pain: You can take over the counter medications such as Tylenol, and Ibuprofen for pain control. Please adhere to the instructions on the back of the bottle.   Follow up:   - Surgery: Please follow up with Dr. Gonzales, or a surgeon of your own choosing, in 1-2 weeks to discuss elective cholecystectomy. Please call [phone] to make an appointment.  - Gastroenterology: Please follow up with Dr. Cooper Fletcher in 3-4 weeks for stent removal. Please call [phone] to schedule an appointment.   - Primary care provider: Please follow up with your PCP regarding diabetes management and monitoring (A1C 6.7 and elevated fingersticks to 200s).   If you develop fevers, chills, worsening pain, nausea that won't subside, vomiting, or any other symptoms of concern, please call MD or return to the ED for further advice, evaluation, and/or treatment.      SECONDARY DISCHARGE DIAGNOSES  Diagnosis: Choledocholithiasis with obstruction  Assessment and Plan of Treatment:     Diagnosis: Transaminitis  Assessment and Plan of Treatment:

## 2024-05-28 NOTE — PROGRESS NOTE PEDS - ASSESSMENT
49y M admitted for choledocholithiasis (Tbili >22), s/p ERCP sphincterotomy and CBD stent placement 5/27.    # Choledocholithiasis s/p ERCP   - Regular diet  - Pain control  - DC zosyn  - Patient refusing inpatient intervention, will plan for elective surgical intervention  ________________________  Surgery - [facility] Team  [phone]

## 2024-05-28 NOTE — DISCHARGE NOTE PROVIDER - CARE PROVIDERS DIRECT ADDRESSES
,[email or web address]

## 2024-05-28 NOTE — PROGRESS NOTE PEDS - SUBJECTIVE AND OBJECTIVE BOX
GENERAL SURGERY PROGRESS NOTE    Patient: MEKHI RANGEL , 49y ([birth year])Male   MRN: [id number]  Location: [address]  Visit: 05-25-24 Inpatient  Date: 05-28-24 @ 09:41      Procedure/Dx/Injuries:    Events of past 24 hours: s/p ERCP, Regular diet, LFTs trending down    PAST MEDICAL & SURGICAL HISTORY:  HTN (hypertension)      No significant past surgical history          Vitals:   T(F): 98.2 (05-28-24 @ 08:18), Max: 98.4 (05-28-24 @ 00:04)  HR: 78 (05-28-24 @ 08:18)  BP: 121/81 (05-28-24 @ 08:18)  RR: 17 (05-28-24 @ 08:18)  SpO2: 97% (05-28-24 @ 08:18)      Diet, Regular:   Low Fat (LOWFAT)  Diet, NPO after Midnight:      NPO Start Date: 26-May-2024,   NPO Start Time: 23:59  Except Medications      Fluids:     I & O's:    05-27-24 @ 07:01  -  05-28-24 @ 07:00  --------------------------------------------------------  IN:    IV PiggyBack: 175 mL    Lactated Ringers: 2880 mL  Total IN: 3055 mL    OUT:    Voided (mL): 500 mL  Total OUT: 500 mL    Total NET: 2555 mL          PHYSICAL EXAM:  PHYSICAL EXAM:  GENERAL: No acute distress, well-developed, scleral icterus  CHEST/LUNG: CTAB; No wheezes, rales, or rhonchi  HEART: Regular rate and rhythm.   ABDOMEN: Soft, non-tender, non-distended  EXTREMITIES:  2+ peripheral pulses b/l, No clubbing, cyanosis, or edema    MEDICATIONS  (STANDING):  acetaminophen     Tablet .. 650 milliGRAM(s) Oral every 6 hours  dextrose 10% Bolus 125 milliLiter(s) IV Bolus once  dextrose 5%. 1000 milliLiter(s) (50 mL/Hr) IV Continuous <Continuous>  dextrose 5%. 1000 milliLiter(s) (100 mL/Hr) IV Continuous <Continuous>  dextrose 50% Injectable 12.5 Gram(s) IV Push once  dextrose 50% Injectable 25 Gram(s) IV Push once  enoxaparin Injectable 40 milliGRAM(s) SubCutaneous every 24 hours  glucagon  Injectable 1 milliGRAM(s) IntraMuscular once  insulin lispro (ADMELOG) corrective regimen sliding scale   SubCutaneous three times a day before meals  lactated ringers. 1000 milliLiter(s) (120 mL/Hr) IV Continuous <Continuous>  metoprolol tartrate 25 milliGRAM(s) Oral every 12 hours  piperacillin/tazobactam IVPB.. 3.375 Gram(s) IV Intermittent every 8 hours    MEDICATIONS  (PRN):  dextrose Oral Gel 15 Gram(s) Oral once PRN Blood Glucose LESS THAN 70 milliGRAM(s)/deciliter      DVT PROPHYLAXIS: enoxaparin Injectable 40 milliGRAM(s) SubCutaneous every 24 hours    GI PROPHYLAXIS:   ANTICOAGULATION:   ANTIBIOTICS:  piperacillin/tazobactam IVPB.. 3.375 Gram(s)            LAB/STUDIES:  Labs:  CAPILLARY BLOOD GLUCOSE      POCT Blood Glucose.: 125 mg/dL (28 May 2024 08:09)  POCT Blood Glucose.: 183 mg/dL (27 May 2024 22:36)  POCT Blood Glucose.: 219 mg/dL (27 May 2024 20:48)  POCT Blood Glucose.: 238 mg/dL (27 May 2024 16:53)  POCT Blood Glucose.: 160 mg/dL (27 May 2024 11:39)                          12.5   8.84  )-----------( 172      ( 27 May 2024 21:12 )             35.7       Auto Neutrophil %: 85.5 % (05-27-24 @ 21:12)  Auto Immature Granulocyte %: 0.5 % (05-27-24 @ 21:12)    05-27    134<L>  |  97<L>  |  10  ----------------------------<  225<H>  4.3   |  24  |  0.9      Calcium: 9.1 mg/dL (05-27-24 @ 21:12)      LFTs:             5.8  | 15.8 | 61       ------------------[243     ( 27 May 2024 21:12 )  3.5  | 11.8 | 149         Lipase:x      Amylase:x             Coags:     12.10  ----< 1.06    ( 26 May 2024 21:42 )     36.6                Urinalysis Basic - ( 27 May 2024 21:12 )    Color: x / Appearance: x / SG: x / pH: x  Gluc: 225 mg/dL / Ketone: x  / Bili: x / Urobili: x   Blood: x / Protein: x / Nitrite: x   Leuk Esterase: x / RBC: x / WBC x   Sq Epi: x / Non Sq Epi: x / Bacteria: x                IMAGING:      ACCESS/ DEVICES:  [xx ] Peripheral IV  [ ] Central Venous Line	[ ] R	[ ] L	[ ] IJ	[ ] Fem	[ ] SC	Placed:   [ ] Arterial Line		[ ] R	[ ] L	[ ] Fem	[ ] Rad	[ ] Ax	Placed:   [ ] PICC:					[ ] Mediport  [ ] Urinary Catheter,  Date Placed:   [ ] Chest tube: [ ] Right, [ ] Left  [ ] HITESH/Kumar Drains

## 2024-05-28 NOTE — PROGRESS NOTE PEDS - ATTENDING COMMENTS
Trauma/Acute Care Surgery Attending Note Attestation    Patient was seen and examined bedside.  I reviewed the resident/PA note and agreed with above assessment and plan with following additions and corrections.    Abdominal pain improved. Tolerated diet. No nausea, vomiting. Wants to follow up outpatient for elective cholecystectomy.    T(C): 36.8 (05-28-24 @ 08:18), Max: 36.9 (05-28-24 @ 00:04)  HR: 78 (05-28-24 @ 08:18) (60 - 82)  BP: 121/81 (05-28-24 @ 08:18) (121/81 - 132/65)  RR: 17 (05-28-24 @ 08:18) (17 - 18)  SpO2: 97% (05-28-24 @ 08:18) (97% - 98%)      05-27-24 @ 07:01  -  05-28-24 @ 07:00  --------------------------------------------------------  IN:    IV PiggyBack: 175 mL    Lactated Ringers: 2880 mL  Total IN: 3055 mL    OUT:    Voided (mL): 500 mL  Total OUT: 500 mL    Total NET: 2555 mL    I independently performed a medically appropriate exam. The exam was notable for jaundiced skin and scleral icterus. Abdomen soft, not tender, not distended.                          12.5   8.84  )-----------( 172      ( 27 May 2024 21:12 )             35.7     05-27    134<L>  |  97<L>  |  10  ----------------------------<  225<H>  4.3   |  24  |  0.9    Ca 9.1; Mg 2.0; Phos 3.7      ( 27 May 2024 21:12 )  Alb: 3.5 g/dL / Pro: 5.8 g/dL / AlkPhos: 243 U/L / ALT: 149 U/L / AST: 61 U/L / GGT:x     / Tbili 15.8 mg/dL/ Dbili 11.8 mg/dL / Indbili 4.0 mg/dL    PT/INR/PTT - ( 26 May 2024 21:42 )   PT: 12.10 sec; INR: 1.06 ratio; PTT 36.6 sec    HgbA1c 6.7%     Assessment/Plan:  49y Male PMH HTN admitted with choledocholithiasis s/p ERCP with stent. ERCP findings of Mirizzi syndrome.  - Choledocholithiasis/Mirizzi syndrome - Tbili trending down, plan for close follow up outpatient for elective cholecystectomy  - HTN - continue home metoprolol  - Diabetes - new diagnosis for patient, disclosed to patient, he would like to be evaluated as outpatient and does not want to stay for endocrine evaluation    Eloy Gonzales MD  Trauma/Acute Care Surgery/Surgical Critical Care Attending Trauma/Acute Care Surgery Attending Note Attestation    Patient was seen and examined bedside on 5/28.  I reviewed the resident/PA note and agreed with above assessment and plan with following additions and corrections.    Abdominal pain improved. Tolerated diet. No nausea, vomiting. Wants to follow up outpatient for elective cholecystectomy.    T(C): 36.8 (05-28-24 @ 08:18), Max: 36.9 (05-28-24 @ 00:04)  HR: 78 (05-28-24 @ 08:18) (60 - 82)  BP: 121/81 (05-28-24 @ 08:18) (121/81 - 132/65)  RR: 17 (05-28-24 @ 08:18) (17 - 18)  SpO2: 97% (05-28-24 @ 08:18) (97% - 98%)      05-27-24 @ 07:01  -  05-28-24 @ 07:00  --------------------------------------------------------  IN:    IV PiggyBack: 175 mL    Lactated Ringers: 2880 mL  Total IN: 3055 mL    OUT:    Voided (mL): 500 mL  Total OUT: 500 mL    Total NET: 2555 mL    I independently performed a medically appropriate exam. The exam was notable for jaundiced skin and scleral icterus. Abdomen soft, not tender, not distended.                          12.5   8.84  )-----------( 172      ( 27 May 2024 21:12 )             35.7     05-27    134<L>  |  97<L>  |  10  ----------------------------<  225<H>  4.3   |  24  |  0.9    Ca 9.1; Mg 2.0; Phos 3.7      ( 27 May 2024 21:12 )  Alb: 3.5 g/dL / Pro: 5.8 g/dL / AlkPhos: 243 U/L / ALT: 149 U/L / AST: 61 U/L / GGT:x     / Tbili 15.8 mg/dL/ Dbili 11.8 mg/dL / Indbili 4.0 mg/dL    PT/INR/PTT - ( 26 May 2024 21:42 )   PT: 12.10 sec; INR: 1.06 ratio; PTT 36.6 sec    HgbA1c 6.7%     Assessment/Plan:  49y Male PMH HTN admitted with choledocholithiasis s/p ERCP with stent. ERCP findings of Mirizzi syndrome.  - Choledocholithiasis/Mirizzi syndrome - Tbili trending down, plan for close follow up outpatient for elective cholecystectomy, discussed discharge plan with patient  - HTN - continue home metoprolol  - Diabetes - disclosed A1c of 6.7% to patient, he reports it was 7.0% before and manages it with lifestyle changes only, follow up with PCP as outpatient    Eloy Gonzales MD  Trauma/Acute Care Surgery/Surgical Critical Care Attending

## 2024-05-28 NOTE — DISCHARGE NOTE NURSING/CASE MANAGEMENT/SOCIAL WORK - PATIENT PORTAL LINK FT
You can access the FollowMyHealth Patient Portal offered by [facility] by registering at the following website: [email or web address]. By joining [email or web address]’s FollowMyHealth portal, you will also be able to view your health information using other applications (apps) compatible with our system.

## 2024-05-28 NOTE — DISCHARGE NOTE PROVIDER - HOSPITAL COURSE
49 year old male with PMH of HTN, cholecystitis (6 years ago), presents with jaundice. Patient reports that he noticed scleral icterus 3 weeks ago. For the past 3 weeks, patient has been experiencing dark urine and light, myesha colored stools. Patient has mild RUQ abdominal pain and nausea, no associated with food for the past 2 weeks. Patient denies fevers, chills, SOB, CP, vomiting. Patient is noncompliant to his blood pressure medications, reports stopped taking his blood pressure medications 3 days ago due to self decision making. At bedside examination, patient has scleral icterus and jaundice throughout, abdomen soft, ND, NT.     < from: CT Abdomen and Pelvis w/ IV Cont (05.25.24 @ 08:42) >  HEPATOBILIARY: There is moderate to severe intrahepatic biliary ductal   dilatation secondary to choledocholithiasis. More specifically, 1 cm   calcified gallstone is impacted within the CBD which measures up to 2 cm.   The CBD in the region of the pancreatic head is normal in caliber.   Cholelithiasis is noted as well.    SICU Consult for Hemodynamic and hepatic function monitoring in setting of choledocholithiasis Patient evaluated in ED with VS /76, HR 75, O2 99% on RA, and temp of 98.6F. Patient appears comfortable, A&O x3. Skin appears significantly jaundiced and B/L scleral icterus noted. Abdomen noted to be soft, nondistended, and minimal RUQ tenderness. Patient is pending RUQ ultrasound. Patient currently not complaining of nausea or vomiting, last BM was 5/24. T bili is 22.1. Patient was ambulating and voiding. Hemodynamically stable and transferred to the floor.     Gastroenterology consulted for ERCP. Patient is s/p ERCP w/ sphincterotomy on 5/27/24. A stone was seen in the cystic duct using fluoroscopy. A [id number] CBD Plastic stent was placed with return of sludge and pus. Recommending inpatient cholecystectomy, but patient would like to follow up outpatient for cholecystectomy. Patient to follow up with GI in 4-6 weeks for repeat ERCP for stent removal.    While inpatient, patient has had elevated blood glucose and has been on an insulin sliding scale. Patient's A1C is 6.7. He should follow up with his primary care provider for further management. Patient tolerating regular diet, no nausea or vomiting. Ambulating, voiding, and having normal BM. LFTs have been downtrending s/p ERCP. He is hemodynamically stable and ready to be discharged home. 49 year old male with PMH of HTN, cholecystitis (6 years ago), presents with jaundice. Patient reports that he noticed scleral icterus 3 weeks ago. For the past 3 weeks, patient has been experiencing dark urine and light, myesha colored stools. Patient has mild RUQ abdominal pain and nausea, no associated with food for the past 2 weeks. Patient denies fevers, chills, SOB, CP, vomiting. Patient is noncompliant to his blood pressure medications, reports stopped taking his blood pressure medications 3 days ago due to self decision making. At bedside examination, patient has scleral icterus and jaundice throughout, abdomen soft, ND, NT.     < from: CT Abdomen and Pelvis w/ IV Cont (05.25.24 @ 08:42) >  HEPATOBILIARY: There is moderate to severe intrahepatic biliary ductal   dilatation secondary to choledocholithiasis. More specifically, 1 cm   calcified gallstone is impacted within the CBD which measures up to 2 cm.   The CBD in the region of the pancreatic head is normal in caliber.   Cholelithiasis is noted as well.    SICU Consult for Hemodynamic and hepatic function monitoring in setting of choledocholithiasis Patient evaluated in ED with VS /76, HR 75, O2 99% on RA, and temp of 98.6F. Patient appears comfortable, A&O x3. Skin appears significantly jaundiced and B/L scleral icterus noted. Abdomen noted to be soft, nondistended, and minimal RUQ tenderness. Patient is pending RUQ ultrasound. Patient currently not complaining of nausea or vomiting, last BM was 5/24. T bili is 22.1. Patient was ambulating and voiding. Hemodynamically stable and transferred to the floor.     Gastroenterology consulted for ERCP. Patient is s/p ERCP w/ sphincterotomy on 5/27/24. A stone was seen in the cystic duct using fluoroscopy. A [id number] CBD Plastic stent was placed with return of sludge and pus. Recommending inpatient cholecystectomy, but patient would like to follow up outpatient for cholecystectomy. Patient to follow up with GI in 4-6 weeks for repeat ERCP for stent removal.    While inpatient, patient has had elevated blood glucose and has been on an insulin sliding scale. Patient's A1C is 6.7. States he has history of diabetes and used to be on medications, but now manages it with lifestyle modifications (reports his A1C was previously 7.0). He should follow up with his primary care provider for further management and monitoring. Patient tolerating regular diet, no nausea or vomiting. Ambulating, voiding, and having normal BM. LFTs have been downtrending s/p ERCP. He is hemodynamically stable and ready to be discharged home.

## 2024-05-28 NOTE — DISCHARGE NOTE PROVIDER - CARE PROVIDER_API CALL
Eloy Gonzales J  Surgery  [address] [place] [id number]  Phone: [phone]  Fax: [phone]  Follow Up Time: 2 weeks   Eloy Gonzales J  Surgery  [address] [place] [id number]  Phone: [phone]  Fax: [phone]  Follow Up Time: 2 weeks    Marques Petty  Gastroenterology  [address] [place] [id number]  Phone: [phone]  Fax: [phone]  Follow Up Time: 1 month

## 2024-05-28 NOTE — PROGRESS NOTE ADULT - SUBJECTIVE AND OBJECTIVE BOX
Patient is a 49 year old male with PMHx of HTN, cholecystitis (6 years ago), presents with jaundice. Patient s/p ERCP notable for with sphincterotomy: A stone was seen in the cystic duct using fluroscopy. A [id number] CBD Plastic stent was placed with return of sludge and pus. Patient feels well today and wants to leave. He is admitted under surgical team.         PAST MEDICAL & SURGICAL HISTORY:  HTN (hypertension)          MEDICATIONS  (STANDING):  acetaminophen     Tablet .. 650 milliGRAM(s) Oral every 6 hours  dextrose 10% Bolus 125 milliLiter(s) IV Bolus once  dextrose 5%. 1000 milliLiter(s) (50 mL/Hr) IV Continuous <Continuous>  dextrose 5%. 1000 milliLiter(s) (100 mL/Hr) IV Continuous <Continuous>  dextrose 50% Injectable 25 Gram(s) IV Push once  dextrose 50% Injectable 12.5 Gram(s) IV Push once  enoxaparin Injectable 40 milliGRAM(s) SubCutaneous every 24 hours  glucagon  Injectable 1 milliGRAM(s) IntraMuscular once  insulin lispro (ADMELOG) corrective regimen sliding scale   SubCutaneous three times a day before meals  lactated ringers. 1000 milliLiter(s) (120 mL/Hr) IV Continuous <Continuous>  metoprolol tartrate 25 milliGRAM(s) Oral every 12 hours  piperacillin/tazobactam IVPB.. 3.375 Gram(s) IV Intermittent every 8 hours    MEDICATIONS  (PRN):  dextrose Oral Gel 15 Gram(s) Oral once PRN Blood Glucose LESS THAN 70 milliGRAM(s)/deciliter      Allergies  No Known Allergies        Review of Systems  General:  Denies Fatigue, Denies Fever, Denies Weakness ,Denies Weight Loss   HEENT: Denies Trouble Swallowing ,Denies  Sore Throat , Denies Change in hearing/vision/speech ,Denies Dizziness    Cardio: Denies  Chest Pain , Palpitations    Respiratory: Denies worsening of SOB, Denies Cough  Abdomen: See detailed HPI  Neuro: Denies Headache Denies Dizziness, Denies Paresthesias  MSK: Denies pain in Bones/Joints/Muscles   Psych: Patient denies depression, denies suicidal or homicidal ideations  Integ: Patient Denies rash, or new skin lesions     Vital Signs Last 24 Hrs  T(C): 36.8 (28 May 2024 05:21), Max: 36.9 (28 May 2024 00:04)  T(F): 98.3 (28 May 2024 05:21), Max: 98.4 (28 May 2024 00:04)  HR: 82 (28 May 2024 05:21) (60 - 95)  BP: 123/79 (28 May 2024 05:21) (114/71 - 156/70)  BP(mean): 108 (27 May 2024 09:26) (108 - 108)  RR: 18 (28 May 2024 05:21) (18 - 18)  SpO2: 98% (28 May 2024 05:21) (94% - 100%)    Parameters below as of 28 May 2024 05:21  Patient On (Oxygen Delivery Method): room air        PHYSICAL EXAM:  GENERAL: NAD, well-appearing  CHEST/LUNG: Clear to auscultation bilaterally  HEART: Regular rate and rhythm  ABDOMEN: Soft, Nontender, Nondistended;   EXTREMITIES:  No clubbing, cyanosis, or edema        Labs:                        12.5   8.84  )-----------( 172      ( 27 May 2024 21:12 )             35.7       Auto Neutrophil %: 85.5 % (05-27-24 @ 21:12)  Auto Immature Granulocyte %: 0.5 % (05-27-24 @ 21:12)    05-27    134<L>  |  97<L>  |  10  ----------------------------<  225<H>  4.3   |  24  |  0.9      Calcium: 9.1 mg/dL (05-27-24 @ 21:12)      LFTs:             5.8  | 15.8 | 61       ------------------[243     ( 27 May 2024 21:12 )  3.5  | 11.8 | 149           Coags:     12.10  ----< 1.06    ( 26 May 2024 21:42 )     36.6      Urinalysis Basic - ( 27 May 2024 21:12 )  Color: x / Appearance: x / SG: x / pH: x  Gluc: 225 mg/dL / Ketone: x  / Bili: x / Urobili: x   Blood: x / Protein: x / Nitrite: x   Leuk Esterase: x / RBC: x / WBC x   Sq Epi: x / Non Sq Epi: x / Bacteria: x

## 2024-05-28 NOTE — DISCHARGE NOTE PROVIDER - NSDCMRMEDTOKEN_GEN_ALL_CORE_FT
acetaminophen 325 mg oral tablet: 2 tab(s) orally every 6 hours as needed for  mild pain  amlodipine-benazepril 5 mg-20 mg oral capsule: 1 cap(s) orally once a day  metoprolol succinate 50 mg oral tablet, extended release: 1 tab(s) orally once a day

## 2024-05-28 NOTE — DISCHARGE NOTE PROVIDER - ATTENDING DISCHARGE PHYSICAL EXAMINATION:
I independently performed a medically appropriate exam. The exam was notable for jaundiced skin and scleral icterus. Abdomen soft, not tender, not distended.

## 2024-05-28 NOTE — DISCHARGE NOTE PROVIDER - PROVIDER TOKENS
PROVIDER:[TOKEN:[[id number]],FOLLOWUP:[2 weeks]] PROVIDER:[TOKEN:[[id number]],FOLLOWUP:[2 weeks]],PROVIDER:[TOKEN:[[id number]],FOLLOWUP:[1 month]]

## 2024-06-01 DIAGNOSIS — Z91.148 PATIENT'S OTHER NONCOMPLIANCE WITH MEDICATION REGIMEN FOR OTHER REASON: ICD-10-CM

## 2024-06-01 DIAGNOSIS — E11.9 TYPE 2 DIABETES MELLITUS WITHOUT COMPLICATIONS: ICD-10-CM

## 2024-06-01 DIAGNOSIS — I10 ESSENTIAL (PRIMARY) HYPERTENSION: ICD-10-CM

## 2024-06-01 DIAGNOSIS — R17 UNSPECIFIED JAUNDICE: ICD-10-CM

## 2024-06-01 DIAGNOSIS — N28.89 OTHER SPECIFIED DISORDERS OF KIDNEY AND URETER: ICD-10-CM

## 2024-06-01 DIAGNOSIS — K80.71 CALCULUS OF GALLBLADDER AND BILE DUCT WITHOUT CHOLECYSTITIS WITH OBSTRUCTION: ICD-10-CM

## 2024-06-03 PROBLEM — Z00.00 ENCOUNTER FOR PREVENTIVE HEALTH EXAMINATION: Status: ACTIVE | Noted: 2024-06-03

## 2024-06-03 PROBLEM — I10 ESSENTIAL (PRIMARY) HYPERTENSION: Chronic | Status: ACTIVE | Noted: 2024-05-25

## 2024-06-04 ENCOUNTER — APPOINTMENT (OUTPATIENT)
Dept: SURGERY | Facility: CLINIC | Age: 49
End: 2024-06-04

## 2024-06-18 ENCOUNTER — APPOINTMENT (OUTPATIENT)
Dept: SURGERY | Facility: CLINIC | Age: 49
End: 2024-06-18
Payer: MEDICAID

## 2024-06-18 VITALS — WEIGHT: 196 LBS | BODY MASS INDEX: 30.76 KG/M2 | HEIGHT: 67 IN

## 2024-06-18 DIAGNOSIS — K80.20 CALCULUS OF GALLBLADDER W/OUT CHOLECYSTITIS W/OUT OBSTRUCTION: ICD-10-CM

## 2024-06-18 DIAGNOSIS — I10 ESSENTIAL (PRIMARY) HYPERTENSION: ICD-10-CM

## 2024-06-18 PROCEDURE — 99205 OFFICE O/P NEW HI 60 MIN: CPT

## 2024-06-18 RX ORDER — AMLODIPINE BESYLATE AND BENAZEPRIL HYDROCHLORIDE 5; 20 MG/1; MG/1
5-20 CAPSULE ORAL DAILY
Qty: 30 | Refills: 0 | Status: ACTIVE | COMMUNITY
Start: 2024-06-18 | End: 1900-01-01

## 2024-06-18 RX ORDER — METOPROLOL SUCCINATE 50 MG/1
50 TABLET, EXTENDED RELEASE ORAL DAILY
Qty: 30 | Refills: 0 | Status: ACTIVE | COMMUNITY
Start: 2024-06-18 | End: 1900-01-01

## 2024-06-18 NOTE — ASSESSMENT
[FreeTextEntry1] : MEKHI RANGEL  is a pleasant 49 year old man  who came in 06/18/2024 for cholelithiasis.  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 Patient was ambulating and voiding. Hemodynamically  stable and transferred to the floor.    Gastroenterology consulted for ERCP. Patient is s/p ERCP w/ sphincterotomy on  5/27/24. A stone was seen in the cystic duct using fluoroscopy. A [id number] CBD  Plastic stent was placed with return of sludge and pus.   offered him robotic cholecystectomy sending him to PCP, contact of new PCP is given as he didnot see his PCP for couple years with need to renew his BP meds, renewed today   the above plan of care with discussed in details to the patient and all questions were answered to patient satisfaction. patient instructed to follow up with the referring physician and patient primary care provider   A total of 80 minutes was spent on this visit, obtaining h/p,  reviewing previous notes/imaging, counseling the patient on coming procedure and f/u , ordering tests (below), and documenting the findings in the note.

## 2024-06-18 NOTE — HISTORY OF PRESENT ILLNESS
[de-identified] : MEKHI RANGEL  is a pleasant 49 year old man  who came in 06/18/2024 for cholelithiasis.  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 Patient was ambulating and voiding. Hemodynamically  stable and transferred to the floor.    Gastroenterology consulted for ERCP. Patient is s/p ERCP w/ sphincterotomy on  5/27/24. A stone was seen in the cystic duct using fluoroscopy. A [id number] CBD  Plastic stent was placed with return of sludge and pus.

## 2024-06-19 ENCOUNTER — NON-APPOINTMENT (OUTPATIENT)
Age: 49
End: 2024-06-19

## 2024-07-19 ENCOUNTER — APPOINTMENT (OUTPATIENT)
Dept: SURGERY | Facility: HOSPITAL | Age: 49
End: 2024-07-19

## 2024-08-01 ENCOUNTER — APPOINTMENT (OUTPATIENT)
Dept: SURGERY | Facility: CLINIC | Age: 49
End: 2024-08-01

## 2025-03-29 ENCOUNTER — RX RENEWAL (OUTPATIENT)
Age: 50
End: 2025-03-29